# Patient Record
Sex: FEMALE | Race: WHITE | Employment: FULL TIME | URBAN - METROPOLITAN AREA
[De-identification: names, ages, dates, MRNs, and addresses within clinical notes are randomized per-mention and may not be internally consistent; named-entity substitution may affect disease eponyms.]

---

## 2017-11-12 ENCOUNTER — HOSPITAL ENCOUNTER (EMERGENCY)
Age: 42
Discharge: HOME | End: 2017-11-12
Attending: EMERGENCY MEDICINE
Payer: COMMERCIAL

## 2017-11-12 VITALS
WEIGHT: 223 LBS | HEART RATE: 64 BPM | TEMPERATURE: 98.3 F | HEIGHT: 68 IN | RESPIRATION RATE: 18 BRPM | SYSTOLIC BLOOD PRESSURE: 136 MMHG | OXYGEN SATURATION: 98 % | BODY MASS INDEX: 33.8 KG/M2 | DIASTOLIC BLOOD PRESSURE: 64 MMHG

## 2017-11-12 DIAGNOSIS — R42 DIZZINESS: Primary | ICD-10-CM

## 2017-11-12 DIAGNOSIS — R11.2 NON-INTRACTABLE VOMITING WITH NAUSEA, UNSPECIFIED VOMITING TYPE: ICD-10-CM

## 2017-11-12 LAB
ALBUMIN SERPL-MCNC: 3.7 G/DL (ref 3.5–5)
ALBUMIN/GLOB SERPL: 1 {RATIO} (ref 1.2–3.5)
ALP SERPL-CCNC: 44 U/L (ref 50–136)
ALT SERPL-CCNC: 23 U/L (ref 12–65)
ANION GAP SERPL CALC-SCNC: 15 MMOL/L (ref 7–16)
AST SERPL-CCNC: 19 U/L (ref 15–37)
ATRIAL RATE: 71 BPM
BACTERIA URNS QL MICRO: 0 /HPF
BASOPHILS # BLD: 0 K/UL (ref 0–0.2)
BASOPHILS NFR BLD: 1 % (ref 0–2)
BILIRUB SERPL-MCNC: 0.4 MG/DL (ref 0.2–1.1)
BUN SERPL-MCNC: 11 MG/DL (ref 6–23)
CALCIUM SERPL-MCNC: 9.2 MG/DL (ref 8.3–10.4)
CALCULATED P AXIS, ECG09: 38 DEGREES
CALCULATED R AXIS, ECG10: -5 DEGREES
CALCULATED T AXIS, ECG11: 54 DEGREES
CASTS URNS QL MICRO: NORMAL /LPF
CHLORIDE SERPL-SCNC: 103 MMOL/L (ref 98–107)
CO2 SERPL-SCNC: 21 MMOL/L (ref 21–32)
CREAT SERPL-MCNC: 1.12 MG/DL (ref 0.6–1)
DIAGNOSIS, 93000: NORMAL
DIFFERENTIAL METHOD BLD: NORMAL
EOSINOPHIL # BLD: 0.1 K/UL (ref 0–0.8)
EOSINOPHIL NFR BLD: 2 % (ref 0.5–7.8)
EPI CELLS #/AREA URNS HPF: NORMAL /HPF
ERYTHROCYTE [DISTWIDTH] IN BLOOD BY AUTOMATED COUNT: 12.7 % (ref 11.9–14.6)
GLOBULIN SER CALC-MCNC: 3.8 G/DL (ref 2.3–3.5)
GLUCOSE SERPL-MCNC: 96 MG/DL (ref 65–100)
HCT VFR BLD AUTO: 41.5 % (ref 35.8–46.3)
HGB BLD-MCNC: 13.6 G/DL (ref 11.7–15.4)
IMM GRANULOCYTES # BLD: 0 K/UL (ref 0–0.5)
IMM GRANULOCYTES NFR BLD AUTO: 0 % (ref 0–5)
LYMPHOCYTES # BLD: 2.4 K/UL (ref 0.5–4.6)
LYMPHOCYTES NFR BLD: 38 % (ref 13–44)
MAGNESIUM SERPL-MCNC: 2.4 MG/DL (ref 1.8–2.4)
MCH RBC QN AUTO: 29.6 PG (ref 26.1–32.9)
MCHC RBC AUTO-ENTMCNC: 32.8 G/DL (ref 31.4–35)
MCV RBC AUTO: 90.4 FL (ref 79.6–97.8)
MONOCYTES # BLD: 0.5 K/UL (ref 0.1–1.3)
MONOCYTES NFR BLD: 8 % (ref 4–12)
NEUTS SEG # BLD: 3.3 K/UL (ref 1.7–8.2)
NEUTS SEG NFR BLD: 51 % (ref 43–78)
P-R INTERVAL, ECG05: 148 MS
PLATELET # BLD AUTO: 211 K/UL (ref 150–450)
PMV BLD AUTO: 12.6 FL (ref 10.8–14.1)
POTASSIUM SERPL-SCNC: 4.1 MMOL/L (ref 3.5–5.1)
PROT SERPL-MCNC: 7.5 G/DL (ref 6.3–8.2)
Q-T INTERVAL, ECG07: 372 MS
QRS DURATION, ECG06: 92 MS
QTC CALCULATION (BEZET), ECG08: 404 MS
RBC # BLD AUTO: 4.59 M/UL (ref 4.05–5.25)
RBC #/AREA URNS HPF: NORMAL /HPF
SODIUM SERPL-SCNC: 139 MMOL/L (ref 136–145)
TROPONIN I SERPL-MCNC: <0.04 NG/ML (ref 0.02–0.05)
TSH SERPL DL<=0.005 MIU/L-ACNC: 2 UIU/ML (ref 0.36–3.74)
VENTRICULAR RATE, ECG03: 71 BPM
WBC # BLD AUTO: 6.5 K/UL (ref 4.3–11.1)
WBC URNS QL MICRO: NORMAL /HPF

## 2017-11-12 PROCEDURE — 96361 HYDRATE IV INFUSION ADD-ON: CPT | Performed by: EMERGENCY MEDICINE

## 2017-11-12 PROCEDURE — 99285 EMERGENCY DEPT VISIT HI MDM: CPT | Performed by: EMERGENCY MEDICINE

## 2017-11-12 PROCEDURE — 83735 ASSAY OF MAGNESIUM: CPT | Performed by: EMERGENCY MEDICINE

## 2017-11-12 PROCEDURE — 85025 COMPLETE CBC W/AUTO DIFF WBC: CPT | Performed by: EMERGENCY MEDICINE

## 2017-11-12 PROCEDURE — 81003 URINALYSIS AUTO W/O SCOPE: CPT | Performed by: EMERGENCY MEDICINE

## 2017-11-12 PROCEDURE — 80053 COMPREHEN METABOLIC PANEL: CPT | Performed by: EMERGENCY MEDICINE

## 2017-11-12 PROCEDURE — 74011250636 HC RX REV CODE- 250/636: Performed by: EMERGENCY MEDICINE

## 2017-11-12 PROCEDURE — 93005 ELECTROCARDIOGRAM TRACING: CPT | Performed by: EMERGENCY MEDICINE

## 2017-11-12 PROCEDURE — 84484 ASSAY OF TROPONIN QUANT: CPT | Performed by: EMERGENCY MEDICINE

## 2017-11-12 PROCEDURE — 81015 MICROSCOPIC EXAM OF URINE: CPT | Performed by: EMERGENCY MEDICINE

## 2017-11-12 PROCEDURE — 96374 THER/PROPH/DIAG INJ IV PUSH: CPT | Performed by: EMERGENCY MEDICINE

## 2017-11-12 PROCEDURE — 84443 ASSAY THYROID STIM HORMONE: CPT | Performed by: EMERGENCY MEDICINE

## 2017-11-12 RX ORDER — ONDANSETRON 8 MG/1
8 TABLET, ORALLY DISINTEGRATING ORAL
Qty: 12 TAB | Refills: 0 | Status: SHIPPED | OUTPATIENT
Start: 2017-11-12 | End: 2018-02-09

## 2017-11-12 RX ORDER — ONDANSETRON 2 MG/ML
4 INJECTION INTRAMUSCULAR; INTRAVENOUS
Status: COMPLETED | OUTPATIENT
Start: 2017-11-12 | End: 2017-11-12

## 2017-11-12 RX ADMIN — SODIUM CHLORIDE 1000 ML: 900 INJECTION, SOLUTION INTRAVENOUS at 09:40

## 2017-11-12 RX ADMIN — ONDANSETRON 4 MG: 2 INJECTION INTRAMUSCULAR; INTRAVENOUS at 09:40

## 2017-11-12 NOTE — ED PROVIDER NOTES
HPI Comments: Patient presents to the ER complaining of an episode of lightheadedness and dizziness this morning. Patient states symptoms started when she woke up this morning. Also felt that her heart was racing. Denies any fevers or vomiting. Currently being treated with antibiotics for left lower leg cellulitis. Patient is a 40 y.o. female presenting with dizziness. The history is provided by the patient. Dizziness   This is a new problem. The current episode started 1 to 2 hours ago. The problem has not changed since onset. Pertinent negatives include no speech difficulty. Associated symptoms include nausea. Pertinent negatives include no vomiting, no altered mental status, no confusion and no bowel incontinence. Past Medical History:   Diagnosis Date    Abnormal Pap smear of cervix     Carcinoma of cervix ([facility] [address] 2003    H/O seasonal allergies     Ovarian cyst        Past Surgical History:   Procedure Laterality Date    HX COLPOSCOPY  2003    HX FREE SKIN GRAFT Right     LEG    HX GYN      vaginal delivery x 2    HX LEEP PROCEDURE  2003    LA DEBRIDEMENT OPEN WOUND 20 SQ CM< Right     LEG         Family History:   Problem Relation Age of Onset    Stroke Father     Heart Disease Father     Hypertension Father     Elevated Lipids Father     COPD Father     Sleep Apnea Father     Arthritis-osteo Father     Heart Attack Father     Heart Failure Father     Kidney Disease Father     Hypertension Mother     Elevated Lipids Mother     Pacemaker Mother     Breast Cancer Maternal Aunt     Crohn's Disease Paternal Grandmother     Other Other      autoimmune    Colon Cancer Neg Hx     Ovarian Cancer Neg Hx     Uterine Cancer Neg Hx        Social History     Social History    Marital status:      Spouse name: N/A    Number of children: N/A    Years of education: N/A     Occupational History    Not on file.      Social History Main Topics    Smoking status: Never Smoker    Smokeless tobacco: Never Used    Alcohol use No    Drug use: No    Sexual activity: Yes     Partners: Male     Birth control/ protection: Condom     Other Topics Concern    Not on file     Social History Narrative         ALLERGIES: Gluten; Codeine; Egg; and Milk    Review of Systems   Constitutional: Positive for fatigue. Negative for fever and unexpected weight change. HENT: Negative for congestion and dental problem. Eyes: Negative for photophobia, redness and visual disturbance. Respiratory: Negative for cough and chest tightness. Cardiovascular: Negative for palpitations and leg swelling. Gastrointestinal: Positive for nausea. Negative for abdominal pain, bowel incontinence and vomiting. Endocrine: Negative for polydipsia and polyphagia. Genitourinary: Negative for flank pain, frequency and urgency. Musculoskeletal: Negative for gait problem. Skin: Negative for pallor and rash. Allergic/Immunologic: Negative for food allergies and immunocompromised state. Neurological: Positive for dizziness. Negative for syncope, speech difficulty, light-headedness and numbness. Hematological: Negative for adenopathy. Does not bruise/bleed easily. Psychiatric/Behavioral: Negative for confusion. Vitals:    11/12/17 0901   BP: 143/66   Pulse: 92   Resp: 20   Temp: 98.2 °F (36.8 °C)   SpO2: 98%   Weight: 101.2 kg (223 lb)   Height: 5' 8\" (1.727 m)            Physical Exam   Constitutional: She appears well-developed and well-nourished. HENT:   Head: Normocephalic and atraumatic. Mouth/Throat: Oropharynx is clear and moist.   Eyes: Conjunctivae and EOM are normal. Pupils are equal, round, and reactive to light. Neck: Normal range of motion. Neck supple. No tracheal deviation present. No thyromegaly present. Cardiovascular: Normal rate and regular rhythm. Exam reveals no friction rub. No murmur heard. Pulmonary/Chest: Effort normal and breath sounds normal. No respiratory distress. She has no rales. Abdominal: Soft. Bowel sounds are normal. She exhibits no distension. There is no tenderness. Musculoskeletal: Normal range of motion. She exhibits no edema or deformity. Neurological: She is alert. She has normal reflexes. No cranial nerve deficit. Skin: Skin is warm and dry. No rash noted. No erythema. Nursing note and vitals reviewed. MDM  Number of Diagnoses or Management Options  Diagnosis management comments: Differential diagnoses includes anxiety, dehydration, electrolyte abnormality  We'll obtain EKG, basic labs and urinalysis. Continue to monitor symptoms    10:16 AM  Normal EKG, normal labs as well as a normal urinalysis  Vital signs have remained stable. Symptomatically patient reports nausea and dizziness has resolved    Appears stable for discharge       Amount and/or Complexity of Data Reviewed  Clinical lab tests: reviewed and ordered    Risk of Complications, Morbidity, and/or Mortality  Presenting problems: low  Diagnostic procedures: low  Management options: low    Patient Progress  Patient progress: stable    ED Course       Procedures    Results Include:    Recent Results (from the past 24 hour(s))   CBC WITH AUTOMATED DIFF    Collection Time: 11/12/17  9:17 AM   Result Value Ref Range    WBC 6.5 4.3 - 11.1 K/uL    RBC 4.59 4.05 - 5.25 M/uL    HGB 13.6 11.7 - 15.4 g/dL    HCT 41.5 35.8 - 46.3 %    MCV 90.4 79.6 - 97.8 FL    MCH 29.6 26.1 - 32.9 PG    MCHC 32.8 31.4 - 35.0 g/dL    RDW 12.7 11.9 - 14.6 %    PLATELET 879 874 - 341 K/uL    MPV 12.6 10.8 - 14.1 FL    DF AUTOMATED      NEUTROPHILS 51 43 - 78 %    LYMPHOCYTES 38 13 - 44 %    MONOCYTES 8 4.0 - 12.0 %    EOSINOPHILS 2 0.5 - 7.8 %    BASOPHILS 1 0.0 - 2.0 %    IMMATURE GRANULOCYTES 0 0.0 - 5.0 %    ABS. NEUTROPHILS 3.3 1.7 - 8.2 K/UL    ABS. LYMPHOCYTES 2.4 0.5 - 4.6 K/UL    ABS. MONOCYTES 0.5 0.1 - 1.3 K/UL    ABS. EOSINOPHILS 0.1 0.0 - 0.8 K/UL    ABS. BASOPHILS 0.0 0.0 - 0.2 K/UL    ABS. IMM. GRANS. 0.0 0.0 - 0.5 K/UL   METABOLIC PANEL, COMPREHENSIVE    Collection Time: 11/12/17  9:17 AM   Result Value Ref Range    Sodium 139 136 - 145 mmol/L    Potassium 4.1 3.5 - 5.1 mmol/L    Chloride 103 98 - 107 mmol/L    CO2 21 21 - 32 mmol/L    Anion gap 15 7 - 16 mmol/L    Glucose 96 65 - 100 mg/dL    BUN 11 6 - 23 MG/DL    Creatinine 1.12 (H) 0.6 - 1.0 MG/DL    GFR est AA >60 >60 ml/min/1.73m2    GFR est non-AA 57 (L) >60 ml/min/1.73m2    Calcium 9.2 8.3 - 10.4 MG/DL    Bilirubin, total 0.4 0.2 - 1.1 MG/DL    ALT (SGPT) 23 12 - 65 U/L    AST (SGOT) 19 15 - 37 U/L    Alk. phosphatase 44 (L) 50 - 136 U/L    Protein, total 7.5 6.3 - 8.2 g/dL    Albumin 3.7 3.5 - 5.0 g/dL    Globulin 3.8 (H) 2.3 - 3.5 g/dL    A-G Ratio 1.0 (L) 1.2 - 3.5     MAGNESIUM    Collection Time: 11/12/17  9:17 AM   Result Value Ref Range    Magnesium 2.4 1.8 - 2.4 mg/dL   TSH 3RD GENERATION    Collection Time: 11/12/17  9:17 AM   Result Value Ref Range    TSH 2.004 0.358 - 3.740 uIU/mL   TROPONIN I    Collection Time: 11/12/17  9:17 AM   Result Value Ref Range    Troponin-I, Qt. <0.04 0.02 - 0.05 NG/ML   EKG, 12 LEAD, INITIAL    Collection Time: 11/12/17  9:25 AM   Result Value Ref Range    Ventricular Rate 71 BPM    Atrial Rate 71 BPM    P-R Interval 148 ms    QRS Duration 92 ms    Q-T Interval 372 ms    QTC Calculation (Bezet) 404 ms    Calculated P Axis 38 degrees    Calculated R Axis -5 degrees    Calculated T Axis 54 degrees    Diagnosis       !! AGE AND GENDER SPECIFIC ECG ANALYSIS !!   Normal sinus rhythm  Normal ECG  No previous ECGs available     URINE MICROSCOPIC    Collection Time: 11/12/17  9:38 AM   Result Value Ref Range    WBC 0-3 0 /hpf    RBC 5-10 0 /hpf    Epithelial cells 3-5 0 /hpf    Bacteria 0 0 /hpf    Casts 0-3 0 /lpf

## 2017-11-12 NOTE — ED TRIAGE NOTES
Pt states she has been taking antibiotics for a cellulitis in left leg for about four weeks but states this morning she woke up with nausea and dizziness. States she feels really tired today also.

## 2017-11-12 NOTE — LETTER
[facility] EMERGENCY DEPT 
[address] [place] [id number] 
[phone] Work/School Note Date: 11/12/2017 To Whom It May concern: 
 
Jayme Fleming was seen and treated today in the emergency room by the following provider(s): 
Attending Provider: Mekhi Gerard MD. Jayme Fleming may return to work on 11/14/17. Sincerely, Amparo Costa RN

## 2017-11-12 NOTE — DISCHARGE INSTRUCTIONS
Nausea and Vomiting: Care Instructions  Your Care Instructions    When you are nauseated, you may feel weak and sweaty and notice a lot of saliva in your mouth. Nausea often leads to vomiting. Most of the time you do not need to worry about nausea and vomiting, but they can be signs of other illnesses. Two common causes of nausea and vomiting are stomach flu and food poisoning. Nausea and vomiting from viral stomach flu will usually start to improve within 24 hours. Nausea and vomiting from food poisoning may last from 12 to 48 hours. The doctor has checked you carefully, but problems can develop later. If you notice any problems or new symptoms, get medical treatment right away. Follow-up care is a key part of your treatment and safety. Be sure to make and go to all appointments, and call your doctor if you are having problems. It's also a good idea to know your test results and keep a list of the medicines you take. How can you care for yourself at home? · To prevent dehydration, drink plenty of fluids, enough so that your urine is light yellow or clear like water. Choose water and other caffeine-free clear liquids until you feel better. If you have kidney, heart, or liver disease and have to limit fluids, talk with your doctor before you increase the amount of fluids you drink. · Rest in bed until you feel better. · When you are able to eat, try clear soups, mild foods, and liquids until all symptoms are gone for 12 to 48 hours. Other good choices include dry toast, crackers, cooked cereal, and gelatin dessert, such as Jell-O. When should you call for help? Call 911 anytime you think you may need emergency care. For example, call if:  ? · You passed out (lost consciousness). ?Call your doctor now or seek immediate medical care if:  ? · You have symptoms of dehydration, such as:  ¨ Dry eyes and a dry mouth. ¨ Passing only a little dark urine.   ¨ Feeling thirstier than usual.   ? · You have new or worsening belly pain. ? · You have a new or higher fever. ? · You vomit blood or what looks like coffee grounds. ? Watch closely for changes in your health, and be sure to contact your doctor if:  ? · You have ongoing nausea and vomiting. ? · Your vomiting is getting worse. ? · Your vomiting lasts longer than 2 days. ? · You are not getting better as expected. Where can you learn more? Go to http://brittaney-rayshawn.info/. Enter 25 [id number] in the search box to learn more about \"Nausea and Vomiting: Care Instructions. \"  Current as of: March 20, 2017  Content Version: 11.4  © 3775-9277 [employer]. Care instructions adapted under license by [facility] (which disclaims liability or warranty for this information). If you have questions about a medical condition or this instruction, always ask your healthcare professional. [address] any warranty or liability for your use of this information.

## 2017-11-12 NOTE — ED NOTES
I have reviewed discharge instructions with the patient, spouse and parent. The patient, spouse and parent verbalized understanding. Patient left ED via Discharge Method: ambulatory to Home with family. Opportunity for questions and clarification provided. Patient given 1 scripts.

## 2018-02-09 ENCOUNTER — HOSPITAL ENCOUNTER (EMERGENCY)
Age: 43
Discharge: HOME | End: 2018-02-09
Attending: EMERGENCY MEDICINE
Payer: COMMERCIAL

## 2018-02-09 ENCOUNTER — APPOINTMENT (OUTPATIENT)
Dept: GENERAL RADIOLOGY | Age: 43
End: 2018-02-09
Attending: EMERGENCY MEDICINE
Payer: COMMERCIAL

## 2018-02-09 VITALS
DIASTOLIC BLOOD PRESSURE: 67 MMHG | RESPIRATION RATE: 16 BRPM | BODY MASS INDEX: 33.8 KG/M2 | OXYGEN SATURATION: 98 % | TEMPERATURE: 98.9 F | HEART RATE: 98 BPM | WEIGHT: 223 LBS | SYSTOLIC BLOOD PRESSURE: 130 MMHG | HEIGHT: 68 IN

## 2018-02-09 DIAGNOSIS — B34.9 VIRAL SYNDROME: ICD-10-CM

## 2018-02-09 DIAGNOSIS — R19.7 DIARRHEA, UNSPECIFIED TYPE: ICD-10-CM

## 2018-02-09 DIAGNOSIS — R11.2 NON-INTRACTABLE VOMITING WITH NAUSEA, UNSPECIFIED VOMITING TYPE: Primary | ICD-10-CM

## 2018-02-09 LAB
ALBUMIN SERPL-MCNC: 3.2 G/DL (ref 3.5–5)
ALBUMIN/GLOB SERPL: 0.8 {RATIO} (ref 1.2–3.5)
ALP SERPL-CCNC: 46 U/L (ref 50–136)
ALT SERPL-CCNC: 110 U/L (ref 12–65)
ANION GAP SERPL CALC-SCNC: 12 MMOL/L (ref 7–16)
AST SERPL-CCNC: 77 U/L (ref 15–37)
BASOPHILS # BLD: 0 K/UL (ref 0–0.2)
BASOPHILS NFR BLD: 0 % (ref 0–2)
BILIRUB SERPL-MCNC: 0.3 MG/DL (ref 0.2–1.1)
BUN SERPL-MCNC: 11 MG/DL (ref 6–23)
CALCIUM SERPL-MCNC: 8.5 MG/DL (ref 8.3–10.4)
CHLORIDE SERPL-SCNC: 106 MMOL/L (ref 98–107)
CO2 SERPL-SCNC: 23 MMOL/L (ref 21–32)
CREAT SERPL-MCNC: 0.92 MG/DL (ref 0.6–1)
DIFFERENTIAL METHOD BLD: ABNORMAL
EOSINOPHIL # BLD: 0 K/UL (ref 0–0.8)
EOSINOPHIL NFR BLD: 1 % (ref 0.5–7.8)
ERYTHROCYTE [DISTWIDTH] IN BLOOD BY AUTOMATED COUNT: 13 % (ref 11.9–14.6)
GLOBULIN SER CALC-MCNC: 3.9 G/DL (ref 2.3–3.5)
GLUCOSE SERPL-MCNC: 104 MG/DL (ref 65–100)
HCT VFR BLD AUTO: 42.6 % (ref 35.8–46.3)
HETEROPH AB SER QL: NEGATIVE
HGB BLD-MCNC: 13.5 G/DL (ref 11.7–15.4)
IMM GRANULOCYTES # BLD: 0 K/UL (ref 0–0.5)
IMM GRANULOCYTES NFR BLD AUTO: 0 % (ref 0–5)
LYMPHOCYTES # BLD: 1.9 K/UL (ref 0.5–4.6)
LYMPHOCYTES NFR BLD: 53 % (ref 13–44)
MCH RBC QN AUTO: 28.2 PG (ref 26.1–32.9)
MCHC RBC AUTO-ENTMCNC: 31.7 G/DL (ref 31.4–35)
MCV RBC AUTO: 88.9 FL (ref 79.6–97.8)
MONOCYTES # BLD: 0.4 K/UL (ref 0.1–1.3)
MONOCYTES NFR BLD: 11 % (ref 4–12)
NEUTS SEG # BLD: 1.2 K/UL (ref 1.7–8.2)
NEUTS SEG NFR BLD: 35 % (ref 43–78)
PLATELET # BLD AUTO: 193 K/UL (ref 150–450)
PMV BLD AUTO: 12.4 FL (ref 10.8–14.1)
POTASSIUM SERPL-SCNC: 3.6 MMOL/L (ref 3.5–5.1)
PROT SERPL-MCNC: 7.1 G/DL (ref 6.3–8.2)
RBC # BLD AUTO: 4.79 M/UL (ref 4.05–5.25)
SODIUM SERPL-SCNC: 141 MMOL/L (ref 136–145)
WBC # BLD AUTO: 3.5 K/UL (ref 4.3–11.1)

## 2018-02-09 PROCEDURE — 81003 URINALYSIS AUTO W/O SCOPE: CPT | Performed by: EMERGENCY MEDICINE

## 2018-02-09 PROCEDURE — 96374 THER/PROPH/DIAG INJ IV PUSH: CPT | Performed by: EMERGENCY MEDICINE

## 2018-02-09 PROCEDURE — 86308 HETEROPHILE ANTIBODY SCREEN: CPT | Performed by: EMERGENCY MEDICINE

## 2018-02-09 PROCEDURE — 94640 AIRWAY INHALATION TREATMENT: CPT

## 2018-02-09 PROCEDURE — 85025 COMPLETE CBC W/AUTO DIFF WBC: CPT | Performed by: EMERGENCY MEDICINE

## 2018-02-09 PROCEDURE — 99284 EMERGENCY DEPT VISIT MOD MDM: CPT | Performed by: EMERGENCY MEDICINE

## 2018-02-09 PROCEDURE — 74011250636 HC RX REV CODE- 250/636: Performed by: EMERGENCY MEDICINE

## 2018-02-09 PROCEDURE — 80053 COMPREHEN METABOLIC PANEL: CPT | Performed by: EMERGENCY MEDICINE

## 2018-02-09 PROCEDURE — 71046 X-RAY EXAM CHEST 2 VIEWS: CPT

## 2018-02-09 PROCEDURE — 74011000250 HC RX REV CODE- 250: Performed by: EMERGENCY MEDICINE

## 2018-02-09 PROCEDURE — 96361 HYDRATE IV INFUSION ADD-ON: CPT | Performed by: EMERGENCY MEDICINE

## 2018-02-09 PROCEDURE — 94664 DEMO&/EVAL PT USE INHALER: CPT

## 2018-02-09 RX ORDER — ONDANSETRON 4 MG/1
4 TABLET, ORALLY DISINTEGRATING ORAL
Qty: 12 TAB | Refills: 0 | Status: SHIPPED | OUTPATIENT
Start: 2018-02-09 | End: 2018-04-16

## 2018-02-09 RX ORDER — ALBUTEROL SULFATE 90 UG/1
2 AEROSOL, METERED RESPIRATORY (INHALATION)
Qty: 1 INHALER | Refills: 2 | Status: SHIPPED | OUTPATIENT
Start: 2018-02-09 | End: 2018-12-19

## 2018-02-09 RX ORDER — ONDANSETRON 2 MG/ML
4 INJECTION INTRAMUSCULAR; INTRAVENOUS
Status: COMPLETED | OUTPATIENT
Start: 2018-02-09 | End: 2018-02-09

## 2018-02-09 RX ORDER — IPRATROPIUM BROMIDE AND ALBUTEROL SULFATE 2.5; .5 MG/3ML; MG/3ML
3 SOLUTION RESPIRATORY (INHALATION)
Status: COMPLETED | OUTPATIENT
Start: 2018-02-09 | End: 2018-02-09

## 2018-02-09 RX ADMIN — IPRATROPIUM BROMIDE AND ALBUTEROL SULFATE 3 ML: 2.5; .5 SOLUTION RESPIRATORY (INHALATION) at 13:14

## 2018-02-09 RX ADMIN — ONDANSETRON 4 MG: 2 INJECTION INTRAMUSCULAR; INTRAVENOUS at 13:57

## 2018-02-09 RX ADMIN — SODIUM CHLORIDE 1000 ML: 900 INJECTION, SOLUTION INTRAVENOUS at 14:00

## 2018-02-09 NOTE — ED PROVIDER NOTES
HPI Comments: Patient is a 28-year-old female who is coming in after being sick since Sunday. She states she went to an urgent care had a flu swab done which was negative. She was diagnosed with sinusitis and bronchitis prescribed antibiotic and a cough syrup which she believes had a pain medicine in that she's felt very sleepy while taking this. She is now developed some vomiting and diarrhea. She denies any abdominal pain. She has no pain anywhere now just feels generalized weakness and fatigue. Patient is a 40 y.o. female presenting with lethargy. The history is provided by the patient. Lethargy   Pertinent negatives include no chest pain, no abdominal pain and no shortness of breath. Past Medical History:   Diagnosis Date    Abnormal Pap smear of cervix     Carcinoma of cervix ([facility] [address] 2003    H/O seasonal allergies     Ovarian cyst        Past Surgical History:   Procedure Laterality Date    HX COLPOSCOPY  2003    HX FREE SKIN GRAFT Right     LEG    HX GYN      vaginal delivery x 2    HX LEEP PROCEDURE  2003    LA DEBRIDEMENT OPEN WOUND 20 SQ CM< Right     LEG         Family History:   Problem Relation Age of Onset    Stroke Father     Heart Disease Father     Hypertension Father     Elevated Lipids Father     COPD Father     Sleep Apnea Father     Arthritis-osteo Father     Heart Attack Father     Heart Failure Father     Kidney Disease Father     Hypertension Mother     Elevated Lipids Mother     Pacemaker Mother     Breast Cancer Maternal Aunt     Crohn's Disease Paternal Grandmother     Other Other      autoimmune    Colon Cancer Neg Hx     Ovarian Cancer Neg Hx     Uterine Cancer Neg Hx        Social History     Social History    Marital status:      Spouse name: N/A    Number of children: N/A    Years of education: N/A     Occupational History    Not on file.      Social History Main Topics    Smoking status: Never Smoker    Smokeless tobacco: Never Used    Alcohol use No    Drug use: No    Sexual activity: Yes     Partners: Male     Birth control/ protection: Condom     Other Topics Concern    Not on file     Social History Narrative         ALLERGIES: Gluten; Codeine; Egg; and Milk    Review of Systems   Constitutional: Positive for chills, fatigue and fever. Respiratory: Negative for chest tightness, shortness of breath, wheezing and stridor. Cardiovascular: Negative for chest pain and palpitations. Gastrointestinal: Negative for abdominal pain, diarrhea, nausea and vomiting. Musculoskeletal: Negative for neck pain and neck stiffness. Skin: Negative. All other systems reviewed and are negative. Vitals:    02/09/18 1206   BP: 117/83   Pulse: 72   Resp: 19   Temp: 97.9 °F (36.6 °C)   SpO2: 100%   Weight: 101.2 kg (223 lb)   Height: 5' 8\" (1.727 m)            Physical Exam   Constitutional: She is oriented to person, place, and time. She appears well-developed and well-nourished. No distress. HENT:   Head: Normocephalic and atraumatic. Eyes: Conjunctivae are normal. No scleral icterus. Neck: Normal range of motion. Neck supple. Cardiovascular: Normal rate, regular rhythm and normal heart sounds. Pulmonary/Chest: Effort normal. No stridor. No respiratory distress. She has wheezes (mainly when she is coughing.  good airmovement). She has no rales. She exhibits no tenderness. Abdominal: Soft. She exhibits no distension. There is no tenderness. There is no rebound and no guarding. Neurological: She is alert and oriented to person, place, and time. No cranial nerve deficit. Coordination normal.   No focal weakness   Skin: Skin is warm and dry. No rash noted. She is not diaphoretic. No erythema. Psychiatric: She has a normal mood and affect. Her behavior is normal.   Nursing note and vitals reviewed.        MDM  Number of Diagnoses or Management Options  Diarrhea, unspecified type:   Non-intractable vomiting with nausea, unspecified vomiting type:   Viral syndrome:   Diagnosis management comments: X-ray normal patient has been up walking around filling pattern getting IV fluids lab work consistent with viral syndrome she has no abdominal pain. I will have her stop cough medicine and will start Zofran and albuterol. Destinee Atkinson MD; 2/9/2018 @2:51 PM Voice dictation software was used during the making of this note. This software is not perfect and grammatical and other typographical errors may be present.   This note has not been proofread for errors.  ===================================================================        Amount and/or Complexity of Data Reviewed  Clinical lab tests: ordered and reviewed (Results for orders placed or performed during the hospital encounter of 02/09/18  -CBC WITH AUTOMATED DIFF       Result                                            Value                         Ref Range                       WBC                                               3.5 (L)                       4.3 - 11.1 K/uL                 RBC                                               4.79                          4.05 - 5.25 M/uL                HGB                                               13.5                          11.7 - 15.4 g/dL                HCT                                               42.6                          35.8 - 46.3 %                   MCV                                               88.9                          79.6 - 97.8 FL                  MCH                                               28.2                          26.1 - 32.9 PG                  MCHC                                              31.7                          31.4 - 35.0 g/dL                RDW                                               13.0                          11.9 - 14.6 %                   PLATELET                                          193                           150 - 450 K/uL                  MPV 12.4                          10.8 - 14.1 FL                  DF                                                AUTOMATED                                                     NEUTROPHILS                                       35 (L)                        43 - 78 %                       LYMPHOCYTES                                       53 (H)                        13 - 44 %                       MONOCYTES                                         11                            4.0 - 12.0 %                    EOSINOPHILS                                       1                             0.5 - 7.8 %                     BASOPHILS                                         0                             0.0 - 2.0 %                     IMMATURE GRANULOCYTES                             0                             0.0 - 5.0 %                     ABS. NEUTROPHILS                                  1.2 (L)                       1.7 - 8.2 K/UL                  ABS. LYMPHOCYTES                                  1.9                           0.5 - 4.6 K/UL                  ABS. MONOCYTES                                    0.4                           0.1 - 1.3 K/UL                  ABS. EOSINOPHILS                                  0.0                           0.0 - 0.8 K/UL                  ABS. BASOPHILS                                    0.0                           0.0 - 0.2 K/UL                  ABS. IMM.  GRANS.                                  0.0                           0.0 - 0.5 K/UL             -METABOLIC PANEL, COMPREHENSIVE       Result                                            Value                         Ref Range                       Sodium                                            141                           136 - 145 mmol/L                Potassium                                         3.6                           3.5 - 5.1 mmol/L                Chloride 106                           98 - 107 mmol/L                 CO2                                               23                            21 - 32 mmol/L                  Anion gap                                         12                            7 - 16 mmol/L                   Glucose                                           104 (H)                       65 - 100 mg/dL                  BUN                                               11                            6 - 23 MG/DL                    Creatinine                                        0.92                          0.6 - 1.0 MG/DL                 GFR est AA                                        >60                           >60 ml/min/1.73m2               GFR est non-AA                                    >60                           >60 ml/min/1.73m2               Calcium                                           8.5                           8.3 - 10.4 MG/DL                Bilirubin, total                                  0.3                           0.2 - 1.1 MG/DL                 ALT (SGPT)                                        110 (H)                       12 - 65 U/L                     AST (SGOT)                                        77 (H)                        15 - 37 U/L                     Alk. phosphatase                                  46 (L)                        50 - 136 U/L                    Protein, total                                    7.1                           6.3 - 8.2 g/dL                  Albumin                                           3.2 (L)                       3.5 - 5.0 g/dL                  Globulin                                          3.9 (H)                       2.3 - 3.5 g/dL                  A-G Ratio                                         0.8 (L)                       1.2 - 3.5                 )  Tests in the radiology section of CPT®: ordered and reviewed (Xr Chest Pa Lat    Result Date: 2/9/2018  2 View Chest X-Ray 2/9/2018 12:21 PM INDICATION: Bronchitis, fever and cough COMPARISON: None available at this hospital PACS FINDINGS: Upright PA and Lateral views are submitted. Incidental-mild prominence of breast shadows. The cardiac and mediastinal contours are normal. The lungs are normally inflated and clear, with normal pulmonary vascularity. There is no focal consolidation, nodule, or pleural effusion. Grossly, the chest wall structures are intact.      IMPRESSION: No acute cardiopulmonary finding.    )          ED Course       Procedures

## 2018-02-09 NOTE — DISCHARGE INSTRUCTIONS
Diarrhea: Care Instructions  Your Care Instructions    Diarrhea is loose, watery stools (bowel movements). The exact cause is often hard to find. Sometimes diarrhea is your body's way of getting rid of what caused an upset stomach. Viruses, food poisoning, and many medicines can cause diarrhea. Some people get diarrhea in response to emotional stress, anxiety, or certain foods. Almost everyone has diarrhea now and then. It usually isn't serious, and your stools will return to normal soon. The important thing to do is replace the fluids you have lost, so you can prevent dehydration. The doctor has checked you carefully, but problems can develop later. If you notice any problems or new symptoms, get medical treatment right away. Follow-up care is a key part of your treatment and safety. Be sure to make and go to all appointments, and call your doctor if you are having problems. It's also a good idea to know your test results and keep a list of the medicines you take. How can you care for yourself at home? · Watch for signs of dehydration, which means your body has lost too much water. Dehydration is a serious condition and should be treated right away. Signs of dehydration are:  ¨ Increasing thirst and dry eyes and mouth. ¨ Feeling faint or lightheaded. ¨ Darker urine, and a smaller amount of urine than normal.  · To prevent dehydration, drink plenty of fluids, enough so that your urine is light yellow or clear like water. Choose water and other caffeine-free clear liquids until you feel better. If you have kidney, heart, or liver disease and have to limit fluids, talk with your doctor before you increase the amount of fluids you drink. · Begin eating small amounts of mild foods the next day, if you feel like it. ¨ Try yogurt that has live cultures of Lactobacillus. (Check the label.)  ¨ Avoid spicy foods, fruits, alcohol, and caffeine until 48 hours after all symptoms are gone.   ¨ Avoid chewing gum that contains sorbitol. ¨ Avoid dairy products (except for yogurt with Lactobacillus) while you have diarrhea and for 3 days after symptoms are gone. · The doctor may recommend that you take over-the-counter medicine, such as loperamide (Imodium), if you still have diarrhea after 6 hours. Read and follow all instructions on the label. Do not use this medicine if you have bloody diarrhea, a high fever, or other signs of serious illness. Call your doctor if you think you are having a problem with your medicine. When should you call for help? Call 911 anytime you think you may need emergency care. For example, call if:  ? · You passed out (lost consciousness). ? · Your stools are maroon or very bloody. ?Call your doctor now or seek immediate medical care if:  ? · You are dizzy or lightheaded, or you feel like you may faint. ? · Your stools are black and look like tar, or they have streaks of blood. ? · You have new or worse belly pain. ? · You have symptoms of dehydration, such as:  ¨ Dry eyes and a dry mouth. ¨ Passing only a little dark urine. ¨ Feeling thirstier than usual.   ? · You have a new or higher fever. ? Watch closely for changes in your health, and be sure to contact your doctor if:  ? · Your diarrhea is getting worse. ? · You see pus in the diarrhea. ? · You are not getting better after 2 days (48 hours). Where can you learn more? Go to http://brittaney-rayshawn.info/. Enter J140 in the search box to learn more about \"Diarrhea: Care Instructions. \"  Current as of: March 20, 2017  Content Version: 11.4  © 8329-8747 [employer]. Care instructions adapted under license by [employer] (which disclaims liability or warranty for this information).  If you have questions about a medical condition or this instruction, always ask your healthcare professional. [address] any warranty or liability for your use of this information Nausea and Vomiting: Care Instructions  Your Care Instructions    When you are nauseated, you may feel weak and sweaty and notice a lot of saliva in your mouth. Nausea often leads to vomiting. Most of the time you do not need to worry about nausea and vomiting, but they can be signs of other illnesses. Two common causes of nausea and vomiting are stomach flu and food poisoning. Nausea and vomiting from viral stomach flu will usually start to improve within 24 hours. Nausea and vomiting from food poisoning may last from 12 to 48 hours. The doctor has checked you carefully, but problems can develop later. If you notice any problems or new symptoms, get medical treatment right away. Follow-up care is a key part of your treatment and safety. Be sure to make and go to all appointments, and call your doctor if you are having problems. It's also a good idea to know your test results and keep a list of the medicines you take. How can you care for yourself at home? · To prevent dehydration, drink plenty of fluids, enough so that your urine is light yellow or clear like water. Choose water and other caffeine-free clear liquids until you feel better. If you have kidney, heart, or liver disease and have to limit fluids, talk with your doctor before you increase the amount of fluids you drink. · Rest in bed until you feel better. · When you are able to eat, try clear soups, mild foods, and liquids until all symptoms are gone for 12 to 48 hours. Other good choices include dry toast, crackers, cooked cereal, and gelatin dessert, such as Jell-O. When should you call for help? Call 911 anytime you think you may need emergency care. For example, call if:  ? · You passed out (lost consciousness). ?Call your doctor now or seek immediate medical care if:  ? · You have symptoms of dehydration, such as:  ¨ Dry eyes and a dry mouth. ¨ Passing only a little dark urine.   ¨ Feeling thirstier than usual.   ? · You have new or worsening belly pain. ? · You have a new or higher fever. ? · You vomit blood or what looks like coffee grounds. ? Watch closely for changes in your health, and be sure to contact your doctor if:  ? · You have ongoing nausea and vomiting. ? · Your vomiting is getting worse. ? · Your vomiting lasts longer than 2 days. ? · You are not getting better as expected. Where can you learn more? Go to http://brittaney-rayshawn.info/. Enter 25 [id number] in the search box to learn more about \"Nausea and Vomiting: Care Instructions. \"  Current as of: March 20, 2017  Content Version: 11.4  © 3333-3568 [employer]. Care instructions adapted under license by [employer] (which disclaims liability or warranty for this information). If you have questions about a medical condition or this instruction, always ask your healthcare professional. [place] [address] any warranty or liability for your use of this information. Viral Respiratory Infection: Care Instructions  Your Care Instructions    Viruses are very small organisms. They grow in number after they enter your body. There are many types that cause different illnesses, such as colds and the mumps. The symptoms of a viral respiratory infection often start quickly. They include a fever, sore throat, and runny nose. You may also just not feel well. Or you may not want to eat much. Most viral respiratory infections are not serious. They usually get better with time and self-care. Antibiotics are not used to treat a viral infection. That's because antibiotics will not help cure a viral illness. In some cases, antiviral medicine can help your body fight a serious viral infection. Follow-up care is a key part of your treatment and safety. Be sure to make and go to all appointments, and call your doctor if you are having problems.  It's also a good idea to know your test results and keep a list of the medicines you take.  How can you care for yourself at home? · Rest as much as possible until you feel better. · Be safe with medicines. Take your medicine exactly as prescribed. Call your doctor if you think you are having a problem with your medicine. You will get more details on the specific medicine your doctor prescribes. · Take an over-the-counter pain medicine, such as acetaminophen (Tylenol), ibuprofen (Advil, Motrin), or naproxen (Aleve), as needed for pain and fever. Read and follow all instructions on the label. Do not give aspirin to anyone younger than 20. It has been linked to Reye syndrome, a serious illness. · Drink plenty of fluids, enough so that your urine is light yellow or clear like water. Hot fluids, such as tea or soup, may help relieve congestion in your nose and throat. If you have kidney, heart, or liver disease and have to limit fluids, talk with your doctor before you increase the amount of fluids you drink. · Try to clear mucus from your lungs by breathing deeply and coughing. · Gargle with warm salt water once an hour. This can help reduce swelling and throat pain. Use 1 teaspoon of salt mixed in 1 cup of warm water. · Do not smoke or allow others to smoke around you. If you need help quitting, talk to your doctor about stop-smoking programs and medicines. These can increase your chances of quitting for good. To avoid spreading the virus  · Cough or sneeze into a tissue. Then throw the tissue away. · If you don't have a tissue, use your hand to cover your cough or sneeze. Then clean your hand. You can also cough into your sleeve. · Wash your hands often. Use soap and warm water. Wash for 15 to 20 seconds each time. · If you don't have soap and water near you, you can clean your hands with alcohol wipes or gel. When should you call for help? Call your doctor now or seek immediate medical care if:  ? · You have a new or higher fever. ? · Your fever lasts more than 48 hours.    ? · You have trouble breathing. ? · You have a fever with a stiff neck or a severe headache. ? · You are sensitive to light. ? · You feel very sleepy or confused. ? Watch closely for changes in your health, and be sure to contact your doctor if:  ? · You do not get better as expected. Where can you learn more? Go to http://brittaney-rayshawn.info/. Enter K563 in the search box to learn more about \"Viral Respiratory Infection: Care Instructions. \"  Current as of: May 12, 2017  Content Version: 11.4  © 4774-0602 [employer]. Care instructions adapted under license by [employer] (which disclaims liability or warranty for this information). If you have questions about a medical condition or this instruction, always ask your healthcare professional. [address] any warranty or liability for your use of this information.

## 2018-02-09 NOTE — ED TRIAGE NOTES
Pt in states she has been sick since Saturday. States seen at urgent care on Sunday and diagnosed with bronchitis sinus infection and fluid on ears. States negative flu swab. States since she has continued to feel bad. States began vomiting today. States diarrhea and fever. Attempted to drink gatorade but began vomiting. States cough.

## 2018-02-09 NOTE — ED NOTES
I have reviewed discharge instructions with the patient. The patient verbalized understanding. Patient left ED via Discharge Method: ambulatory to Home with (insert name of family/friend, self, transport vai family). Opportunity for questions and clarification provided. Patient given 2 scripts. To continue your aftercare when you leave the hospital, you may receive an automated call from our care team to check in on how you are doing. This is a free service and part of our promise to provide the best care and service to meet your aftercare needs.  If you have questions, or wish to unsubscribe from this service please call [phone]. Thank you for Choosing our [facility] Emergency Department.

## 2018-04-16 PROBLEM — K81.2 ACUTE CHOLECYSTITIS WITH CHRONIC CHOLECYSTITIS: Status: ACTIVE | Noted: 2018-03-08

## 2018-12-19 ENCOUNTER — HOSPITAL ENCOUNTER (EMERGENCY)
Age: 43
Discharge: HOME | End: 2018-12-19
Attending: EMERGENCY MEDICINE
Payer: COMMERCIAL

## 2018-12-19 ENCOUNTER — APPOINTMENT (OUTPATIENT)
Dept: CT IMAGING | Age: 43
End: 2018-12-19
Attending: EMERGENCY MEDICINE
Payer: COMMERCIAL

## 2018-12-19 VITALS
HEART RATE: 81 BPM | WEIGHT: 181 LBS | HEIGHT: 69 IN | RESPIRATION RATE: 18 BRPM | DIASTOLIC BLOOD PRESSURE: 63 MMHG | TEMPERATURE: 98.2 F | SYSTOLIC BLOOD PRESSURE: 118 MMHG | OXYGEN SATURATION: 98 % | BODY MASS INDEX: 26.81 KG/M2

## 2018-12-19 DIAGNOSIS — J01.00 ACUTE NON-RECURRENT MAXILLARY SINUSITIS: Primary | ICD-10-CM

## 2018-12-19 LAB
ANION GAP SERPL CALC-SCNC: 9 MMOL/L
BASOPHILS # BLD: 0 K/UL (ref 0–0.2)
BASOPHILS NFR BLD: 0 % (ref 0–2)
BUN SERPL-MCNC: 12 MG/DL (ref 6–23)
CALCIUM SERPL-MCNC: 8.9 MG/DL (ref 8.3–10.4)
CHLORIDE SERPL-SCNC: 105 MMOL/L (ref 98–107)
CO2 SERPL-SCNC: 25 MMOL/L (ref 21–32)
CREAT SERPL-MCNC: 0.91 MG/DL (ref 0.6–1)
DIFFERENTIAL METHOD BLD: NORMAL
EOSINOPHIL # BLD: 0.1 K/UL (ref 0–0.8)
EOSINOPHIL NFR BLD: 2 % (ref 0.5–7.8)
ERYTHROCYTE [DISTWIDTH] IN BLOOD BY AUTOMATED COUNT: 12.3 % (ref 11.9–14.6)
GLUCOSE SERPL-MCNC: 90 MG/DL (ref 65–100)
HCG UR QL: NEGATIVE
HCT VFR BLD AUTO: 38.3 % (ref 35.8–46.3)
HGB BLD-MCNC: 12.5 G/DL (ref 11.7–15.4)
IMM GRANULOCYTES # BLD: 0 K/UL (ref 0–0.5)
IMM GRANULOCYTES NFR BLD AUTO: 0 % (ref 0–5)
LYMPHOCYTES # BLD: 3.6 K/UL (ref 0.5–4.6)
LYMPHOCYTES NFR BLD: 38 % (ref 13–44)
MCH RBC QN AUTO: 30.6 PG (ref 26.1–32.9)
MCHC RBC AUTO-ENTMCNC: 32.6 G/DL (ref 31.4–35)
MCV RBC AUTO: 93.9 FL (ref 79.6–97.8)
MONOCYTES # BLD: 0.8 K/UL (ref 0.1–1.3)
MONOCYTES NFR BLD: 9 % (ref 4–12)
NEUTS SEG # BLD: 4.9 K/UL (ref 1.7–8.2)
NEUTS SEG NFR BLD: 51 % (ref 43–78)
NRBC # BLD: 0 K/UL (ref 0–0.2)
PLATELET # BLD AUTO: 206 K/UL (ref 150–450)
PMV BLD AUTO: 12 FL (ref 9.4–12.3)
POTASSIUM SERPL-SCNC: 3.9 MMOL/L (ref 3.5–5.1)
RBC # BLD AUTO: 4.08 M/UL (ref 4.05–5.2)
SODIUM SERPL-SCNC: 139 MMOL/L (ref 136–145)
WBC # BLD AUTO: 9.6 K/UL (ref 4.3–11.1)

## 2018-12-19 PROCEDURE — 80048 BASIC METABOLIC PNL TOTAL CA: CPT

## 2018-12-19 PROCEDURE — 81025 URINE PREGNANCY TEST: CPT

## 2018-12-19 PROCEDURE — 74011250636 HC RX REV CODE- 250/636: Performed by: EMERGENCY MEDICINE

## 2018-12-19 PROCEDURE — 70450 CT HEAD/BRAIN W/O DYE: CPT

## 2018-12-19 PROCEDURE — 85025 COMPLETE CBC W/AUTO DIFF WBC: CPT

## 2018-12-19 PROCEDURE — 96374 THER/PROPH/DIAG INJ IV PUSH: CPT | Performed by: EMERGENCY MEDICINE

## 2018-12-19 PROCEDURE — 74011250637 HC RX REV CODE- 250/637: Performed by: EMERGENCY MEDICINE

## 2018-12-19 PROCEDURE — 96375 TX/PRO/DX INJ NEW DRUG ADDON: CPT | Performed by: EMERGENCY MEDICINE

## 2018-12-19 PROCEDURE — 99284 EMERGENCY DEPT VISIT MOD MDM: CPT | Performed by: EMERGENCY MEDICINE

## 2018-12-19 RX ORDER — DEXAMETHASONE SODIUM PHOSPHATE 100 MG/10ML
10 INJECTION INTRAMUSCULAR; INTRAVENOUS
Status: COMPLETED | OUTPATIENT
Start: 2018-12-19 | End: 2018-12-19

## 2018-12-19 RX ORDER — FLUTICASONE PROPIONATE 50 MCG
2 SPRAY, SUSPENSION (ML) NASAL DAILY
Qty: 1 BOTTLE | Refills: 0 | Status: SHIPPED | OUTPATIENT
Start: 2018-12-19 | End: 2019-01-02

## 2018-12-19 RX ORDER — AMOXICILLIN AND CLAVULANATE POTASSIUM 875; 125 MG/1; MG/1
1 TABLET, FILM COATED ORAL 2 TIMES DAILY
Qty: 20 TAB | Refills: 0 | Status: SHIPPED | OUTPATIENT
Start: 2018-12-19 | End: 2018-12-29

## 2018-12-19 RX ORDER — PROCHLORPERAZINE EDISYLATE 5 MG/ML
10 INJECTION INTRAMUSCULAR; INTRAVENOUS
Status: COMPLETED | OUTPATIENT
Start: 2018-12-19 | End: 2018-12-19

## 2018-12-19 RX ORDER — DIPHENHYDRAMINE HCL 25 MG
25 CAPSULE ORAL
Status: COMPLETED | OUTPATIENT
Start: 2018-12-19 | End: 2018-12-19

## 2018-12-19 RX ORDER — IBUPROFEN 800 MG/1
800 TABLET ORAL
Qty: 20 TAB | Refills: 0 | Status: SHIPPED | OUTPATIENT
Start: 2018-12-19 | End: 2018-12-26

## 2018-12-19 RX ORDER — AMOXICILLIN AND CLAVULANATE POTASSIUM 875; 125 MG/1; MG/1
1 TABLET, FILM COATED ORAL
Status: COMPLETED | OUTPATIENT
Start: 2018-12-19 | End: 2018-12-19

## 2018-12-19 RX ADMIN — PROCHLORPERAZINE EDISYLATE 10 MG: 5 INJECTION INTRAMUSCULAR; INTRAVENOUS at 19:30

## 2018-12-19 RX ADMIN — DEXAMETHASONE SODIUM PHOSPHATE 10 MG: 10 INJECTION INTRAMUSCULAR; INTRAVENOUS at 19:30

## 2018-12-19 RX ADMIN — SODIUM CHLORIDE 1000 ML: 900 INJECTION, SOLUTION INTRAVENOUS at 19:30

## 2018-12-19 RX ADMIN — AMOXICILLIN AND CLAVULANATE POTASSIUM 1 TABLET: 875; 125 TABLET, FILM COATED ORAL at 21:31

## 2018-12-19 RX ADMIN — DIPHENHYDRAMINE HYDROCHLORIDE 25 MG: 25 CAPSULE ORAL at 19:30

## 2018-12-20 NOTE — ED NOTES
I have reviewed discharge instructions with the patient. The patient verbalized understanding. Patient left ED via Discharge Method: ambulatory to Home with family / friend. Opportunity for questions and clarification provided. Patient given 3 scripts. To continue your aftercare when you leave the hospital, you may receive an automated call from our care team to check in on how you are doing. This is a free service and part of our promise to provide the best care and service to meet your aftercare needs.  If you have questions, or wish to unsubscribe from this service please call [phone]. Thank you for Choosing our [facility] Emergency Department.

## 2018-12-20 NOTE — ED PROVIDER NOTES
HPI:  16-year-old female history of thyroid disease is here with headaches. Has been dealing with a sinusitis, nasal discharge, green for the past 2-3 days. Today started to get a headache that form a ring around the left side of her eye with associated headaches with associated light sensitivity. Denies any trauma. Typically does not get headaches. However this is progressive in nature and not severe acute onset. Not on blood thinner. No known family history of brain aneurysm. She denies any fever. Denies any trauma to the eyes    ROS  Constitutional: No fever, no chills  Skin: no rash  Eye: No vision changes  ENMT: No sore throat  Respiratory: No shortness of breath, no cough  Cardiovascular: No chest pain, no palpitations  Gastrointestinal: No vomiting, no nausea, no diarrhea, no abdominal pain  : No dysuria  MSK: No back pain, no muscle pain, no joint pain  Neuro: + headache, no change in mental status, no numbness, no tingling, no weakness  Psych:   Endocrine:   All other review of systems positive per history of present illness and the above otherwise negative or noncontributory. Visit Vitals  BP (!) 125/98 (BP 1 Location: Left arm, BP Patient Position: At rest)   Pulse 81   Temp 98.7 °F (37.1 °C)   Resp 18   Ht 5' 9\" (1.753 m)   Wt 82.1 kg (181 lb)   LMP 12/05/2018 (Exact Date)   SpO2 100%   BMI 26.73 kg/m²     Past Medical History:   Diagnosis Date    Abnormal Pap smear of cervix     Carcinoma of cervix ([facility] [address] 2003    H/O seasonal allergies     Ovarian cyst      Past Surgical History:   Procedure Laterality Date    HX CHOLECYSTECTOMY      HX COLPOSCOPY  2003    HX FREE SKIN GRAFT Right     LEG    HX GYN      vaginal delivery x 2    HX LEEP PROCEDURE  2003    MS DEBRIDEMENT OPEN WOUND 20 SQ CM< Right     LEG     Prior to Admission Medications   Prescriptions Last Dose Informant Patient Reported? Taking?    SYNTHROID 75 mcg tablet   No No   Sig: Take 1 Tab by mouth Daily (before breakfast). Indications: hypothyroidism   inhalational spacing device   No No   Si Each by Does Not Apply route as needed. loratadine (CLARITIN) 10 mg tablet   Yes No   Sig: Take 10 mg by mouth. Facility-Administered Medications: None         Adult Exam   General: alert, no acute distress  Head: normocephalic, atraumatic  ENT: moist mucous membranes  Boggy nasal turbinates bilaterally. Normal posterior pharynx. TM without signs of otitis media  Tenderness upon percussion over the left maxillary, frontal sinuses  Neck: supple, non-tender; full range of motion  Cardiovascular: regular rate and rhythm, normal peripheral perfusion, no edema  Respiratory:  normal respirations; no wheezing, rales or rhonchi  Gastrointestinal: soft, non-tender; no rebound or guarding, no peritoneal signs, no distension  Back: non-tender, full range of motion  Musculoskeletal: normal range of motion, normal strength, no gross deformities  Neurological: alert and oriented x 4, no gross focal deficits; normal speech. No pronator drift. Psychiatric: cooperative; appropriate mood and affect    MDM: differential diagnoses includes sinusitis, intracranial hemorrhage low likelihood, venous thrombosis although low likelihood without any obvious risk factor. She is not on birth control, exogenous estrogen. Tenderness on percussion. We'll obtain CT scan of the head. We'll give headache cocktail and reassess. 9:02 PM  Headache resolved. CT head obtained with left sinusitis. Consistent with exam and history. Low suspicion for venous thrombosis. Will give Augmentin  Twice a day for 10 days, recommend Sudafed over-the-counter for sinus congestion. Sinus rinse also recommended and Flonase. Motrin as needed for pain. Return precautions given. No further questions or concern. Do not suspect meningitis, intracranial hemorrhage, dissection, CVA, TIA, temporal arteritis at this time.        Recent Results (from the past 12 hour(s)) HCG URINE, QL. - POC    Collection Time: 12/19/18  7:14 PM   Result Value Ref Range    Pregnancy test,urine (POC) NEGATIVE  NEG     CBC WITH AUTOMATED DIFF    Collection Time: 12/19/18  7:36 PM   Result Value Ref Range    WBC 9.6 4.3 - 11.1 K/uL    RBC 4.08 4.05 - 5.2 M/uL    HGB 12.5 11.7 - 15.4 g/dL    HCT 38.3 35.8 - 46.3 %    MCV 93.9 79.6 - 97.8 FL    MCH 30.6 26.1 - 32.9 PG    MCHC 32.6 31.4 - 35.0 g/dL    RDW 12.3 11.9 - 14.6 %    PLATELET 514 940 - 320 K/uL    MPV 12.0 9.4 - 12.3 FL    ABSOLUTE NRBC 0.00 0.0 - 0.2 K/uL    DF AUTOMATED      NEUTROPHILS 51 43 - 78 %    LYMPHOCYTES 38 13 - 44 %    MONOCYTES 9 4.0 - 12.0 %    EOSINOPHILS 2 0.5 - 7.8 %    BASOPHILS 0 0.0 - 2.0 %    IMMATURE GRANULOCYTES 0 0.0 - 5.0 %    ABS. NEUTROPHILS 4.9 1.7 - 8.2 K/UL    ABS. LYMPHOCYTES 3.6 0.5 - 4.6 K/UL    ABS. MONOCYTES 0.8 0.1 - 1.3 K/UL    ABS. EOSINOPHILS 0.1 0.0 - 0.8 K/UL    ABS. BASOPHILS 0.0 0.0 - 0.2 K/UL    ABS. IMM. GRANS. 0.0 0.0 - 0.5 K/UL   METABOLIC PANEL, BASIC    Collection Time: 12/19/18  7:36 PM   Result Value Ref Range    Sodium 139 136 - 145 mmol/L    Potassium 3.9 3.5 - 5.1 mmol/L    Chloride 105 98 - 107 mmol/L    CO2 25 21 - 32 mmol/L    Anion gap 9 mmol/L    Glucose 90 65 - 100 mg/dL    BUN 12 6 - 23 MG/DL    Creatinine 0.91 0.6 - 1.0 MG/DL    GFR est AA >60 >60 ml/min/1.73m2    GFR est non-AA >60 ml/min/1.73m2    Calcium 8.9 8.3 - 10.4 MG/DL       Ct Head Wo Cont    Result Date: 12/19/2018  CT head without contrast History: left sided headache that started today. Light sensitivity Technique: 5mm axial images were obtained from the skull base to the vertex without intravenous contrast.  Radiation dose reduction techniques were used for this study:  Our CT scanners use one or all of the following: Automated exposure control, adjustment of the mA and/or kVp according to patient's size, iterative reconstruction.  Comparison: None Findings: The ventricles and sulci are normal in size and configuration. There are no extra-axial fluid collections. There is no evidence to suggest an acute major territorial infarct. There is no evidence of acute intraparenchymal hemorrhage or mass effect. The bony calvarium is intact. There is partially imaged moderate amount of layering fluid within the left maxillary sinus. There is mild mucosal thickening within left anterior ethmoid air cells. Impression: 1. Left-sided paranasal sinus disease with findings suspicious for acute maxillary sinusitis. 2. Otherwise unremarkable unenhanced CT scan of the brain. Dragon voice recognition software was used to create this note. Although the note has been reviewed and corrected where necessary, additional errors may have been overlooked and remain in the text.

## 2018-12-20 NOTE — DISCHARGE INSTRUCTIONS
Complete course of antibiotic. Take Sudafed over-the-counter. Use Flonase. Take Motrin 800 mg as needed for headaches. Return immediately if any other emergencies.

## 2019-03-13 PROBLEM — E03.9 ACQUIRED HYPOTHYROIDISM: Status: ACTIVE | Noted: 2019-03-13

## 2019-03-13 PROBLEM — K81.2 ACUTE CHOLECYSTITIS WITH CHRONIC CHOLECYSTITIS: Status: RESOLVED | Noted: 2018-03-08 | Resolved: 2019-03-13

## 2019-06-21 ENCOUNTER — HOSPITAL ENCOUNTER (OUTPATIENT)
Dept: MAMMOGRAPHY | Age: 44
Discharge: HOME | End: 2019-06-21
Attending: NURSE PRACTITIONER
Payer: COMMERCIAL

## 2019-06-21 DIAGNOSIS — Z12.31 SCREENING MAMMOGRAM, ENCOUNTER FOR: ICD-10-CM

## 2019-06-21 PROCEDURE — 77067 SCR MAMMO BI INCL CAD: CPT

## 2022-03-19 PROBLEM — E03.9 ACQUIRED HYPOTHYROIDISM: Status: ACTIVE | Noted: 2019-03-13

## 2024-03-25 NOTE — PROGRESS NOTES
Patient presents today for a routine gynecological examination with no complaints.    OB History          4    Para        Term   2            AB   2    Living   2         SAB        IAB        Ectopic        Molar        Multiple        Live Births                      GYN History   Last Pap Exam: 2019; Results: Negative; HPV: Negative  Mammogram: 2019; Results: Negative      No LMP recorded. Cycle Length {Numbers; 0-100:84136} Lasting {Numbers; 0-10:18683}  {Pos/neg trace:49232} dysmenorrhea; {Pos/neg trace:88978} postcoital bleeding    Past Medical History:  Past Medical History:   Diagnosis Date    Abnormal Pap smear of cervix     Carcinoma of cervix (HCC)     H/O seasonal allergies     Hypothyroid     Ovarian cyst        Past Surgical History:  Past Surgical History:   Procedure Laterality Date    CHOLECYSTECTOMY      COLPOSCOPY      DEBRIDEMENT OPEN WOUND 20 SQ CM< Right     LEG    LEEP  2003    SKIN GRAFT Right     LEG       Allergies:   Allergies   Allergen Reactions    Gluten Meal Diarrhea     Food     Egg White (Egg Protein) Other (See Comments)    Milk (Cow) Other (See Comments)    Chlorhexidine Rash    Codeine Anxiety       Medication History:  Current Outpatient Medications   Medication Sig Dispense Refill    levothyroxine (SYNTHROID) 88 MCG tablet Take 88 mcg by mouth every morning (before breakfast)      loratadine (CLARITIN) 10 MG tablet Take 10 mg by mouth       No current facility-administered medications for this visit.       Social History:  Social History     Socioeconomic History    Marital status:      Spouse name: Not on file    Number of children: Not on file    Years of education: Not on file    Highest education level: Not on file   Occupational History    Not on file   Tobacco Use    Smoking status: Never    Smokeless tobacco: Never   Substance and Sexual Activity    Alcohol use: No    Drug use: No    Sexual activity: Not on file   Other Topics

## 2024-03-27 ENCOUNTER — OFFICE VISIT (OUTPATIENT)
Dept: OBGYN CLINIC | Age: 49
End: 2024-03-27
Payer: COMMERCIAL

## 2024-03-27 VITALS
DIASTOLIC BLOOD PRESSURE: 72 MMHG | HEIGHT: 68 IN | WEIGHT: 219.6 LBS | SYSTOLIC BLOOD PRESSURE: 115 MMHG | BODY MASS INDEX: 33.28 KG/M2

## 2024-03-27 DIAGNOSIS — R53.83 OTHER FATIGUE: ICD-10-CM

## 2024-03-27 DIAGNOSIS — N92.1 MENORRHAGIA WITH IRREGULAR CYCLE: ICD-10-CM

## 2024-03-27 DIAGNOSIS — N92.6 IRREGULAR BLEEDING: ICD-10-CM

## 2024-03-27 DIAGNOSIS — Z13.89 SCREENING FOR GENITOURINARY CONDITION: Primary | ICD-10-CM

## 2024-03-27 DIAGNOSIS — N94.6 DYSMENORRHEA: ICD-10-CM

## 2024-03-27 DIAGNOSIS — R41.89 BRAIN FOG: ICD-10-CM

## 2024-03-27 LAB
25(OH)D3 SERPL-MCNC: 25.7 NG/ML (ref 30–100)
BASOPHILS # BLD: 0.1 K/UL (ref 0–0.2)
BASOPHILS NFR BLD: 1 % (ref 0–2)
BILIRUBIN, URINE, POC: NEGATIVE
BLOOD URINE, POC: NORMAL
DIFFERENTIAL METHOD BLD: ABNORMAL
EOSINOPHIL # BLD: 0.2 K/UL (ref 0–0.8)
EOSINOPHIL NFR BLD: 3 % (ref 0.5–7.8)
ERYTHROCYTE [DISTWIDTH] IN BLOOD BY AUTOMATED COUNT: 12.3 % (ref 11.9–14.6)
FSH SERPL-ACNC: 10.4 MIU/ML
GLUCOSE URINE, POC: NEGATIVE
HCT VFR BLD AUTO: 41.7 % (ref 35.8–46.3)
HGB BLD-MCNC: 13.2 G/DL (ref 11.7–15.4)
IMM GRANULOCYTES # BLD AUTO: 0 K/UL (ref 0–0.5)
IMM GRANULOCYTES NFR BLD AUTO: 0 % (ref 0–5)
KETONES, URINE, POC: NEGATIVE
LEUKOCYTE ESTERASE, URINE, POC: NEGATIVE
LYMPHOCYTES # BLD: 3.7 K/UL (ref 0.5–4.6)
LYMPHOCYTES NFR BLD: 45 % (ref 13–44)
MCH RBC QN AUTO: 30.3 PG (ref 26.1–32.9)
MCHC RBC AUTO-ENTMCNC: 31.7 G/DL (ref 31.4–35)
MCV RBC AUTO: 95.6 FL (ref 82–102)
MONOCYTES # BLD: 0.6 K/UL (ref 0.1–1.3)
MONOCYTES NFR BLD: 7 % (ref 4–12)
NEUTS SEG # BLD: 3.5 K/UL (ref 1.7–8.2)
NEUTS SEG NFR BLD: 44 % (ref 43–78)
NITRITE, URINE, POC: NEGATIVE
NRBC # BLD: 0 K/UL (ref 0–0.2)
PH, URINE, POC: 6 (ref 4.6–8)
PLATELET # BLD AUTO: 237 K/UL (ref 150–450)
PMV BLD AUTO: 12.7 FL (ref 9.4–12.3)
PROTEIN,URINE, POC: NEGATIVE
RBC # BLD AUTO: 4.36 M/UL (ref 4.05–5.2)
SPECIFIC GRAVITY, URINE, POC: 1.02 (ref 1–1.03)
T4 FREE SERPL-MCNC: 1.2 NG/DL (ref 0.78–1.46)
TSH, 3RD GENERATION: 2.05 UIU/ML (ref 0.36–3.74)
URINALYSIS CLARITY, POC: CLEAR
URINALYSIS COLOR, POC: YELLOW
UROBILINOGEN, POC: NORMAL
WBC # BLD AUTO: 8.1 K/UL (ref 4.3–11.1)

## 2024-03-27 PROCEDURE — 99204 OFFICE O/P NEW MOD 45 MIN: CPT | Performed by: NURSE PRACTITIONER

## 2024-03-27 PROCEDURE — 81002 URINALYSIS NONAUTO W/O SCOPE: CPT | Performed by: NURSE PRACTITIONER

## 2024-03-27 RX ORDER — PERPHENAZINE 4 MG
TABLET ORAL
COMMUNITY

## 2024-03-27 NOTE — PROGRESS NOTES
The patient is a 48 y.o. No obstetric history on file. who is seen for irregular menses/dysmenrrhea. Notes will occasionally skip cycles and then when cycles come on may bleed for 2 weeks at a time. On heavy days of cycle will change pad every 2 hours. First 2-3 days are heaviest. Changes began approx 1 year ago. States one month may bleed heavily, but have no pain. The next month may have a light bleed, but with extensive pelvic discomfort. Describes pain as unilateral, but sometimes on L and sometimes on R. Notes pain is sharp.  Notes increased fatigue/brain fog/joint pain. She has always attributed her joint pain to her autoimmune disorder. Also notes h/o hypothyroidism so has always attributed symptoms of  fatigue and brain fog to this. Notes it occurred to her could be secondary to menopause also, so wanted to discuss     HISTORY:    No obstetric history on file.  Patient's last menstrual period was 02/27/2024 (exact date).  Sexual History:  single partner, contraception - none  Contraception:  none  Current Outpatient Medications on File Prior to Visit   Medication Sig Dispense Refill    Turmeric (QC TUMERIC COMPLEX PO) Take by mouth      Collagen-Vitamin C-Biotin (COLLAGEN 1500/C) 500-50-0.8 MG CAPS Take by mouth      levothyroxine (SYNTHROID) 88 MCG tablet Take 1 tablet by mouth every morning (before breakfast)      loratadine (CLARITIN) 10 MG tablet Take 1 tablet by mouth       No current facility-administered medications on file prior to visit.       ROS:  Feeling well. No dyspnea or chest pain on exertion.  No abdominal pain, change in bowel habits, black or bloody stools.  No urinary tract symptoms. GYN ROS: see above.    PHYSICAL EXAM:  Blood pressure 115/72, height 1.727 m (5' 8\"), weight 99.6 kg (219 lb 9.6 oz), last menstrual period 02/27/2024.    The patient appears well, alert, oriented x 3, in no distress.    ASSESSMENT:  Encounter Diagnoses   Name Primary?    Screening for genitourinary condition

## 2024-03-28 RX ORDER — ERGOCALCIFEROL 1.25 MG/1
50000 CAPSULE ORAL WEEKLY
Qty: 8 CAPSULE | Refills: 0 | OUTPATIENT
Start: 2024-03-28

## 2024-04-05 ENCOUNTER — TELEPHONE (OUTPATIENT)
Dept: OBGYN CLINIC | Age: 49
End: 2024-04-05

## 2024-04-08 NOTE — TELEPHONE ENCOUNTER
From: BERNARDA SALEEM  To: Xochitl Martinez  Sent: 4/5/2024 9:01 AM EDT  Subject: Pharmacy    Good morning,    What is your preferred pharmacy? We do not have one saved in our system for some reason.    Thanks,  Maxi JONES

## 2024-04-10 ENCOUNTER — OFFICE VISIT (OUTPATIENT)
Dept: OBGYN CLINIC | Age: 49
End: 2024-04-10
Payer: COMMERCIAL

## 2024-04-10 ENCOUNTER — PATIENT MESSAGE (OUTPATIENT)
Dept: OBGYN CLINIC | Age: 49
End: 2024-04-10

## 2024-04-10 ENCOUNTER — PROCEDURE VISIT (OUTPATIENT)
Dept: OBGYN CLINIC | Age: 49
End: 2024-04-10
Payer: COMMERCIAL

## 2024-04-10 VITALS — BODY MASS INDEX: 33.59 KG/M2 | WEIGHT: 220.9 LBS | SYSTOLIC BLOOD PRESSURE: 138 MMHG | DIASTOLIC BLOOD PRESSURE: 80 MMHG

## 2024-04-10 DIAGNOSIS — N92.1 MENORRHAGIA WITH IRREGULAR CYCLE: Primary | ICD-10-CM

## 2024-04-10 DIAGNOSIS — N94.6 DYSMENORRHEA: ICD-10-CM

## 2024-04-10 DIAGNOSIS — N92.6 IRREGULAR MENSES: Primary | ICD-10-CM

## 2024-04-10 PROCEDURE — 76830 TRANSVAGINAL US NON-OB: CPT | Performed by: OBSTETRICS & GYNECOLOGY

## 2024-04-10 PROCEDURE — 99214 OFFICE O/P EST MOD 30 MIN: CPT | Performed by: NURSE PRACTITIONER

## 2024-04-10 RX ORDER — ERGOCALCIFEROL 1.25 MG/1
50000 CAPSULE ORAL WEEKLY
Qty: 8 CAPSULE | Refills: 0 | Status: SHIPPED | OUTPATIENT
Start: 2024-04-10

## 2024-04-10 RX ORDER — NORETHINDRONE ACETATE AND ETHINYL ESTRADIOL AND FERROUS FUMARATE 1MG-20(24)
1 KIT ORAL DAILY
Qty: 3 PACKET | Refills: 3 | Status: SHIPPED | OUTPATIENT
Start: 2024-04-10 | End: 2024-04-10 | Stop reason: SDUPTHER

## 2024-04-10 RX ORDER — NORETHINDRONE ACETATE AND ETHINYL ESTRADIOL AND FERROUS FUMARATE 1MG-20(24)
1 KIT ORAL DAILY
Qty: 3 PACKET | Refills: 3 | Status: SHIPPED | OUTPATIENT
Start: 2024-04-10

## 2024-04-10 RX ORDER — ERGOCALCIFEROL 1.25 MG/1
50000 CAPSULE ORAL WEEKLY
Qty: 8 CAPSULE | Refills: 0 | Status: SHIPPED | OUTPATIENT
Start: 2024-04-10 | End: 2024-04-10 | Stop reason: SDUPTHER

## 2024-04-10 NOTE — TELEPHONE ENCOUNTER
From: Xochitl Martinez  To: Davina Mireles  Sent: 4/10/2024 3:07 PM EDT  Subject: Scripts sent in this morning     Good afternoon dear. I hope that you’ve had a good day.  I called [facility] to see when the medicines would be ready for , and they say that they haven’t received anything today.  Could you possibly resend them?

## 2024-04-10 NOTE — PROGRESS NOTES
The patient is a 48 y.o. No obstetric history on file. who is seen for US to follow up on irregular menses/dysmenrrhea. Notes will occasionally skip cycles and then when cycles come on may bleed for 2 weeks at a time. On heavy days of cycle will change pad every 2 hours. First 2-3 days are heaviest. Changes began approx 1 year ago. States one month may bleed heavily, but have no pain. The next month may have a light bleed, but with extensive pelvic discomfort. Describes pain as unilateral, but sometimes on L and sometimes on R. Notes pain is sharp.  Notes increased fatigue/brain fog/joint pain. She has always attributed her joint pain to her autoimmune disorder. Also notes h/o hypothyroidism so has always attributed symptoms of  fatigue and brain fog to this. Notes it occurred to her could be secondary to menopause also, so wanted to discuss     US today:          HISTORY:    No obstetric history on file.  Patient's last menstrual period was 02/27/2024 (exact date).  Sexual History:  see above  Contraception:  none  Current Outpatient Medications on File Prior to Visit   Medication Sig Dispense Refill    Turmeric (QC TUMERIC COMPLEX PO) Take by mouth      Collagen-Vitamin C-Biotin (COLLAGEN 1500/C) 500-50-0.8 MG CAPS Take by mouth      levothyroxine (SYNTHROID) 88 MCG tablet Take 1 tablet by mouth every morning (before breakfast)      loratadine (CLARITIN) 10 MG tablet Take 1 tablet by mouth       No current facility-administered medications on file prior to visit.       ROS:  Feeling well. No dyspnea or chest pain on exertion.  No abdominal pain, change in bowel habits, black or bloody stools.  No urinary tract symptoms. GYN ROS: see above.    PHYSICAL EXAM:  Blood pressure 138/80, weight 100.2 kg (220 lb 14.4 oz), last menstrual period 02/27/2024.    The patient appears well, alert, oriented x 3, in no distress.    ASSESSMENT:  Encounter Diagnosis   Name Primary?    Irregular menses Yes       PLAN:  All questions

## 2024-04-10 NOTE — RESULT ENCOUNTER NOTE
Ultrasound shows uterus of normal size and endometrium of 9 mm.  Right ovary is visualized with a proximately 2 cm cyst left ovary has a 1 and half centimeter cyst both appear physiologic.  No adnexal mass or fluid is seen.  Please see full report under imaging  Impression normal uterus.  Bilateral physiologic ovarian cyst.

## 2024-07-09 NOTE — PROGRESS NOTES
The patient is a 49 y.o. presents to the office for a follow up visit from 4/10/24 for medication recheck. Pt was prescribed Junel 24/4 to help with irregular menses/mood instability and recurrent issues with ruptured cysts. Pt states she has seen significant improvements as of today and is feeling much better. No current complaints. States medication is doing well. BP slightly elevated today. States under significant amount of stress with work and feels is very much situational.    HISTORY:    Patient's last menstrual period was 2024.  Sexual History:  has sex with males  Contraception:  OCP (estrogen/progesterone)  Current Outpatient Medications on File Prior to Visit   Medication Sig Dispense Refill    Norethin Ace-Eth Estrad-FE (JUNEL FE 24) 1-20 MG-MCG(24) TABS Take 1 tablet by mouth daily 3 packet 3    vitamin D (ERGOCALCIFEROL) 1.25 MG (18869 UT) CAPS capsule Take 1 capsule by mouth once a week 8 capsule 0    Turmeric (QC TUMERIC COMPLEX PO) Take by mouth      Collagen-Vitamin C-Biotin (COLLAGEN 1500/C) 500-50-0.8 MG CAPS Take by mouth      levothyroxine (SYNTHROID) 88 MCG tablet Take 1 tablet by mouth every morning (before breakfast)      loratadine (CLARITIN) 10 MG tablet Take 1 tablet by mouth       No current facility-administered medications on file prior to visit.       ROS:  Feeling well. No dyspnea or chest pain on exertion.  No abdominal pain, change in bowel habits, black or bloody stools.  No urinary tract symptoms. GYN ROS: see above.    PHYSICAL EXAM:  Blood pressure (!) 146/90, height 1.727 m (5' 8\"), weight 98.4 kg (216 lb 14.4 oz), last menstrual period 2024.    The patient appears well, alert, oriented x 3, in no distress.    ASSESSMENT:  Encounter Diagnoses   Name Primary?    Follow-up encounter involving medication Yes    Irregular menses     Irritability     Mittelschmerz phenomenon        PLAN:  All questions answered  Spent a great deal of time discussing above noted

## 2024-07-10 ENCOUNTER — OFFICE VISIT (OUTPATIENT)
Dept: OBGYN CLINIC | Age: 49
End: 2024-07-10
Payer: COMMERCIAL

## 2024-07-10 VITALS
HEIGHT: 68 IN | BODY MASS INDEX: 32.87 KG/M2 | WEIGHT: 216.9 LBS | DIASTOLIC BLOOD PRESSURE: 92 MMHG | SYSTOLIC BLOOD PRESSURE: 158 MMHG

## 2024-07-10 DIAGNOSIS — N94.0 MITTELSCHMERZ PHENOMENON: ICD-10-CM

## 2024-07-10 DIAGNOSIS — Z79.899 FOLLOW-UP ENCOUNTER INVOLVING MEDICATION: Primary | ICD-10-CM

## 2024-07-10 DIAGNOSIS — N92.6 IRREGULAR MENSES: ICD-10-CM

## 2024-07-10 DIAGNOSIS — R45.4 IRRITABILITY: ICD-10-CM

## 2024-07-10 PROCEDURE — 99214 OFFICE O/P EST MOD 30 MIN: CPT | Performed by: NURSE PRACTITIONER

## 2024-08-19 NOTE — PROGRESS NOTES
The patient is a 49 y.o. who presents to the office for a follow up visit for BP check from 7/10/24. Pt had elevated BP reading (158/92) at last visit but felt it was situational due to stress from work.     HISTORY:    Patient's last menstrual period was 06/20/2024 (approximate).  Sexual History:  has sex with males  Contraception:  OCP (estrogen/progesterone)    Current Outpatient Medications on File Prior to Visit   Medication Sig Dispense Refill    Norethin Ace-Eth Estrad-FE (JUNEL FE 24) 1-20 MG-MCG(24) TABS Take 1 tablet by mouth daily 3 packet 3    vitamin D (ERGOCALCIFEROL) 1.25 MG (80968 UT) CAPS capsule Take 1 capsule by mouth once a week 8 capsule 0    Turmeric (QC TUMERIC COMPLEX PO) Take by mouth      Collagen-Vitamin C-Biotin (COLLAGEN 1500/C) 500-50-0.8 MG CAPS Take by mouth      levothyroxine (SYNTHROID) 88 MCG tablet Take 1 tablet by mouth every morning (before breakfast)      loratadine (CLARITIN) 10 MG tablet Take 1 tablet by mouth       No current facility-administered medications on file prior to visit.       ROS:  Feeling well. No dyspnea or chest pain on exertion.  No abdominal pain, change in bowel habits, black or bloody stools.  No urinary tract symptoms. GYN ROS: {gyn ros:036265}.    PHYSICAL EXAM:  Blood pressure (!) 144/90, height 1.727 m (5' 8\"), weight 98.7 kg (217 lb 8 oz), last menstrual period 06/20/2024.    The patient appears well, alert, oriented x 3, in no distress.  Lungs are clear. Heart RRR, no murmurs. Abdomen soft without tenderness, guarding, mass or organomegaly.  Pelvic: {pelvic exam:826191::\"normal external genitalia, vulva, vagina, cervix, uterus and adnexa\"}.    ASSESSMENT:  No diagnosis found.    PLAN:  {Gyn plan:78965}  No orders of the defined types were placed in this encounter.        Supervising physician is  ****.  Greater than 50% of the *** minute visit were spent in counseling to the above topics.

## 2024-08-20 ENCOUNTER — OFFICE VISIT (OUTPATIENT)
Dept: OBGYN CLINIC | Age: 49
End: 2024-08-20
Payer: COMMERCIAL

## 2024-08-20 VITALS
HEIGHT: 68 IN | BODY MASS INDEX: 32.96 KG/M2 | WEIGHT: 217.5 LBS | SYSTOLIC BLOOD PRESSURE: 144 MMHG | DIASTOLIC BLOOD PRESSURE: 90 MMHG

## 2024-08-20 DIAGNOSIS — N92.6 IRREGULAR MENSES: ICD-10-CM

## 2024-08-20 DIAGNOSIS — R03.0 ELEVATED BP WITHOUT DIAGNOSIS OF HYPERTENSION: Primary | ICD-10-CM

## 2024-08-20 PROCEDURE — 99213 OFFICE O/P EST LOW 20 MIN: CPT | Performed by: NURSE PRACTITIONER

## 2024-08-20 NOTE — PROGRESS NOTES
The patient is a 49 y.o. who presents to the office for a follow up visit for BP check from 7/10/24. Pt had elevated BP reading (158/92) at last visit but felt it was situational due to stress from work. Has since left previous position and feels stress levels improved. Remains asymptomatic. On Junel 1/20. 2    HISTORY:    Patient's last menstrual period was 06/20/2024 (approximate).  Sexual History:  has sex with males  Contraception:  OCP (estrogen/progesterone)    Current Outpatient Medications on File Prior to Visit   Medication Sig Dispense Refill    Norethin Ace-Eth Estrad-FE (JUNEL FE 24) 1-20 MG-MCG(24) TABS Take 1 tablet by mouth daily 3 packet 3    vitamin D (ERGOCALCIFEROL) 1.25 MG (14425 UT) CAPS capsule Take 1 capsule by mouth once a week 8 capsule 0    Turmeric (QC TUMERIC COMPLEX PO) Take by mouth      Collagen-Vitamin C-Biotin (COLLAGEN 1500/C) 500-50-0.8 MG CAPS Take by mouth      levothyroxine (SYNTHROID) 88 MCG tablet Take 1 tablet by mouth every morning (before breakfast)      loratadine (CLARITIN) 10 MG tablet Take 1 tablet by mouth       No current facility-administered medications on file prior to visit.       ROS:  Feeling well. No dyspnea or chest pain on exertion.  No abdominal pain, change in bowel habits, black or bloody stools.  No urinary tract symptoms. GYN ROS: see above.    PHYSICAL EXAM:  Blood pressure (!) 144/90, height 1.727 m (5' 8\"), weight 98.7 kg (217 lb 8 oz), last menstrual period 06/20/2024.    The patient appears well, alert, oriented x 3, in no distress.    ASSESSMENT:  Encounter Diagnoses   Name Primary?    Elevated BP without diagnosis of hypertension Yes    Irregular menses        PLAN:  All questions answered  BP remains elevated  As pt is anxious about POP will start with lolo and bring back in 6 weeks for med check  Discussed 3mo to reach max efficacy, but should see BP improvements by follow up  Would also like her to follow up with PCP regarding BP

## 2024-10-01 ENCOUNTER — OFFICE VISIT (OUTPATIENT)
Dept: OBGYN CLINIC | Age: 49
End: 2024-10-01
Payer: COMMERCIAL

## 2024-10-01 VITALS
BODY MASS INDEX: 32.8 KG/M2 | SYSTOLIC BLOOD PRESSURE: 146 MMHG | DIASTOLIC BLOOD PRESSURE: 86 MMHG | HEIGHT: 68 IN | WEIGHT: 216.4 LBS

## 2024-10-01 DIAGNOSIS — R03.0 ELEVATED BP WITHOUT DIAGNOSIS OF HYPERTENSION: Primary | ICD-10-CM

## 2024-10-01 PROCEDURE — 99213 OFFICE O/P EST LOW 20 MIN: CPT | Performed by: NURSE PRACTITIONER
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
## 2025-03-27 NOTE — PROGRESS NOTES
The patient is a 49 y.o.  who is seen for 6 month recheck BP. Was previously on Junel 1/20 for ovarian suppression/cycle control. Pt noted increased BP on Junel, so was instructed to follow up with PCP and was canged to dionisiolo. Pt did not start on the new OCP due to issues with her pharmacy. Has been on no form of hormonal contraception and BP today 140/80. Notes occasional bounding pulse but otherwise asymptomatic.     HISTORY:      Patient's last menstrual period was 2025.  Sexual History:  has sex with males  Contraception:  none  Current Outpatient Medications on File Prior to Visit   Medication Sig Dispense Refill    levothyroxine (SYNTHROID) 88 MCG tablet Take 1 tablet by mouth every morning (before breakfast)      loratadine (CLARITIN) 10 MG tablet Take 1 tablet by mouth       No current facility-administered medications on file prior to visit.       ROS:  Feeling well. No dyspnea or chest pain on exertion.  No abdominal pain, change in bowel habits, black or bloody stools.  No urinary tract symptoms. GYN ROS: see above.    PHYSICAL EXAM:  Blood pressure (!) 140/80, height 1.727 m (5' 8\"), weight 99.2 kg (218 lb 11.2 oz), last menstrual period 2025.    The patient appears well, alert, oriented x 3, in no distress.    ASSESSMENT:  Encounter Diagnoses   Name Primary?    Irregular menses Yes    Menorrhagia with irregular cycle     Mittelschmerz phenomenon     Elevated BP without diagnosis of hypertension        PLAN:  All questions answered  Spent a great deal of time discussing above noted concerns/plan of care  Discussed at this time, as BP not controlled OFF of medication, recommend progestin only   Discussed as she's not concerned with pregnancy prevention, will send rx for aygestin  Discussed this is not to be relied on for contraception and unfortunately not the greatest ovarian suppression  Encouraged to follow up with PCP  Will schedule WWE in 3mo and assess response to new rx at

## 2025-03-31 ENCOUNTER — APPOINTMENT (OUTPATIENT)
Dept: GENERAL RADIOLOGY | Age: 50
End: 2025-03-31
Payer: COMMERCIAL

## 2025-03-31 ENCOUNTER — HOSPITAL ENCOUNTER (EMERGENCY)
Age: 50
Discharge: HOME | End: 2025-03-31
Payer: COMMERCIAL

## 2025-03-31 ENCOUNTER — OFFICE VISIT (OUTPATIENT)
Dept: OBGYN CLINIC | Age: 50
End: 2025-03-31
Payer: COMMERCIAL

## 2025-03-31 VITALS
HEIGHT: 68 IN | BODY MASS INDEX: 33.15 KG/M2 | WEIGHT: 218.7 LBS | SYSTOLIC BLOOD PRESSURE: 140 MMHG | DIASTOLIC BLOOD PRESSURE: 80 MMHG

## 2025-03-31 VITALS
BODY MASS INDEX: 33.04 KG/M2 | HEART RATE: 70 BPM | DIASTOLIC BLOOD PRESSURE: 78 MMHG | OXYGEN SATURATION: 100 % | HEIGHT: 68 IN | WEIGHT: 218 LBS | SYSTOLIC BLOOD PRESSURE: 134 MMHG | RESPIRATION RATE: 17 BRPM | TEMPERATURE: 98.2 F

## 2025-03-31 DIAGNOSIS — N92.1 MENORRHAGIA WITH IRREGULAR CYCLE: ICD-10-CM

## 2025-03-31 DIAGNOSIS — N94.0 MITTELSCHMERZ PHENOMENON: ICD-10-CM

## 2025-03-31 DIAGNOSIS — R03.0 ELEVATED BP WITHOUT DIAGNOSIS OF HYPERTENSION: ICD-10-CM

## 2025-03-31 DIAGNOSIS — N92.6 IRREGULAR MENSES: Primary | ICD-10-CM

## 2025-03-31 DIAGNOSIS — R06.09 DYSPNEA ON EXERTION: Primary | ICD-10-CM

## 2025-03-31 LAB
ALBUMIN SERPL-MCNC: 3.7 G/DL (ref 3.5–5)
ALBUMIN/GLOB SERPL: 1 (ref 1–1.9)
ALP SERPL-CCNC: 52 U/L (ref 35–104)
ALT SERPL-CCNC: 19 U/L (ref 8–45)
ANION GAP SERPL CALC-SCNC: 12 MMOL/L (ref 7–16)
AST SERPL-CCNC: 18 U/L (ref 15–37)
BASOPHILS # BLD: 0.05 K/UL (ref 0–0.2)
BASOPHILS NFR BLD: 0.6 % (ref 0–2)
BILIRUB SERPL-MCNC: 0.3 MG/DL (ref 0–1.2)
BUN SERPL-MCNC: 10 MG/DL (ref 6–23)
CALCIUM SERPL-MCNC: 9.7 MG/DL (ref 8.8–10.2)
CHLORIDE SERPL-SCNC: 105 MMOL/L (ref 98–107)
CO2 SERPL-SCNC: 23 MMOL/L (ref 20–29)
CREAT SERPL-MCNC: 0.92 MG/DL (ref 0.6–1.1)
D DIMER PPP FEU-MCNC: <0.27 UG/ML(FEU)
DIFFERENTIAL METHOD BLD: NORMAL
EKG ATRIAL RATE: 67 BPM
EKG DIAGNOSIS: NORMAL
EKG P AXIS: 46 DEGREES
EKG P-R INTERVAL: 149 MS
EKG Q-T INTERVAL: 391 MS
EKG QRS DURATION: 104 MS
EKG QTC CALCULATION (BAZETT): 413 MS
EKG R AXIS: 10 DEGREES
EKG T AXIS: 46 DEGREES
EKG VENTRICULAR RATE: 67 BPM
EOSINOPHIL # BLD: 0.19 K/UL (ref 0–0.8)
EOSINOPHIL NFR BLD: 2.2 % (ref 0.5–7.8)
ERYTHROCYTE [DISTWIDTH] IN BLOOD BY AUTOMATED COUNT: 12.6 % (ref 11.9–14.6)
GLOBULIN SER CALC-MCNC: 3.7 G/DL (ref 2.3–3.5)
GLUCOSE SERPL-MCNC: 98 MG/DL (ref 70–99)
HCT VFR BLD AUTO: 42.7 % (ref 35.8–46.3)
HGB BLD-MCNC: 13.6 G/DL (ref 11.7–15.4)
IMM GRANULOCYTES # BLD AUTO: 0.02 K/UL (ref 0–0.5)
IMM GRANULOCYTES NFR BLD AUTO: 0.2 % (ref 0–5)
LIPASE SERPL-CCNC: 31 U/L (ref 13–60)
LYMPHOCYTES # BLD: 2.83 K/UL (ref 0.5–4.6)
LYMPHOCYTES NFR BLD: 33.5 % (ref 13–44)
MCH RBC QN AUTO: 30 PG (ref 26.1–32.9)
MCHC RBC AUTO-ENTMCNC: 31.9 G/DL (ref 31.4–35)
MCV RBC AUTO: 94.1 FL (ref 82–102)
MONOCYTES # BLD: 0.6 K/UL (ref 0.1–1.3)
MONOCYTES NFR BLD: 7.1 % (ref 4–12)
NEUTS SEG # BLD: 4.76 K/UL (ref 1.7–8.2)
NEUTS SEG NFR BLD: 56.4 % (ref 43–78)
NRBC # BLD: 0 K/UL (ref 0–0.2)
NT PRO BNP: 105 PG/ML (ref 0–125)
PLATELET # BLD AUTO: 255 K/UL (ref 150–450)
PMV BLD AUTO: 11.3 FL (ref 9.4–12.3)
POTASSIUM SERPL-SCNC: 4.1 MMOL/L (ref 3.5–5.1)
PROT SERPL-MCNC: 7.5 G/DL (ref 6.3–8.2)
RBC # BLD AUTO: 4.54 M/UL (ref 4.05–5.2)
SODIUM SERPL-SCNC: 140 MMOL/L (ref 136–145)
TROPONIN T SERPL HS-MCNC: 6.2 NG/L (ref 0–14)
TROPONIN T SERPL HS-MCNC: <6 NG/L (ref 0–14)
TSH W FREE THYROID IF ABNORMAL: 3.65 UIU/ML (ref 0.27–4.2)
WBC # BLD AUTO: 8.5 K/UL (ref 4.3–11.1)

## 2025-03-31 PROCEDURE — 84443 ASSAY THYROID STIM HORMONE: CPT

## 2025-03-31 PROCEDURE — 71046 X-RAY EXAM CHEST 2 VIEWS: CPT

## 2025-03-31 PROCEDURE — 93005 ELECTROCARDIOGRAM TRACING: CPT

## 2025-03-31 PROCEDURE — 93010 ELECTROCARDIOGRAM REPORT: CPT | Performed by: INTERNAL MEDICINE

## 2025-03-31 PROCEDURE — 85025 COMPLETE CBC W/AUTO DIFF WBC: CPT

## 2025-03-31 PROCEDURE — 80053 COMPREHEN METABOLIC PANEL: CPT

## 2025-03-31 PROCEDURE — 99285 EMERGENCY DEPT VISIT HI MDM: CPT

## 2025-03-31 PROCEDURE — 84484 ASSAY OF TROPONIN QUANT: CPT

## 2025-03-31 PROCEDURE — 85379 FIBRIN DEGRADATION QUANT: CPT

## 2025-03-31 PROCEDURE — 83880 ASSAY OF NATRIURETIC PEPTIDE: CPT

## 2025-03-31 PROCEDURE — 99214 OFFICE O/P EST MOD 30 MIN: CPT | Performed by: NURSE PRACTITIONER

## 2025-03-31 PROCEDURE — 83690 ASSAY OF LIPASE: CPT

## 2025-03-31 RX ORDER — NORETHINDRONE 5 MG/1
10 TABLET ORAL DAILY
Qty: 60 TABLET | Refills: 11 | Status: SHIPPED | OUTPATIENT
Start: 2025-03-31

## 2025-03-31 ASSESSMENT — LIFESTYLE VARIABLES
HOW MANY STANDARD DRINKS CONTAINING ALCOHOL DO YOU HAVE ON A TYPICAL DAY: 1 OR 2
HOW OFTEN DO YOU HAVE A DRINK CONTAINING ALCOHOL: MONTHLY OR LESS

## 2025-03-31 ASSESSMENT — ENCOUNTER SYMPTOMS
CHOKING: 0
SHORTNESS OF BREATH: 1
APNEA: 0
WHEEZING: 0
COUGH: 0
STRIDOR: 0
CHEST TIGHTNESS: 0
ABDOMINAL PAIN: 0

## 2025-03-31 ASSESSMENT — PAIN - FUNCTIONAL ASSESSMENT: PAIN_FUNCTIONAL_ASSESSMENT: NONE - DENIES PAIN

## 2025-03-31 ASSESSMENT — VISUAL ACUITY: OU: 1

## 2025-03-31 NOTE — DISCHARGE INSTRUCTIONS
As discussed your workup today in the emergency department is reassuring.  Please follow-up with your primary care provider regarding your shortness of breath.  Continue to rest as needed.  If your symptoms change or worsen please return to the emergency department.

## 2025-03-31 NOTE — DISCHARGE INSTR - COC
Continuity of Care Form    Patient Name: Xochitl Martinez   :  1975  MRN:  [id number]    Admit date:  3/31/2025  Discharge date:  ***    Code Status Order: No Order   Advance Directives:     Admitting Physician:  No admitting provider for patient encounter.  PCP: Nilesh Bhatia APRN - NP    Discharging Nurse: ***  Discharging Hospital Unit/Room#: ER05/05  Discharging Unit Phone Number: ***    Emergency Contact:   Extended Emergency Contact Information  Primary Emergency Contact: Bashir Martinez  Address: [address] [place] [id number] [place]  Mobile Phone: [phone]  Relation: Spouse    Past Surgical History:  Past Surgical History:   Procedure Laterality Date    CHOLECYSTECTOMY      COLPOSCOPY      DEBRIDEMENT OPEN WOUND 20 SQ CM< Right     LEG    LEEP  2003    SKIN GRAFT Right     LEG       Immunization History:   Immunization History   Administered Date(s) Administered    TDaP, ADACEL (age 10y-64y), BOOSTRIX (age 10y+), IM, 0.5mL 2011       Active Problems:  Patient Active Problem List   Diagnosis Code    Ovarian cyst N83.209    Carcinoma of cervix (HCC) C53.9    Acquired hypothyroidism E03.9       Isolation/Infection:   Isolation            No Isolation          Patient Infection Status    None to display         Nurse Assessment:  Last Vital Signs: /78   Pulse 70   Temp 98.2 °F (36.8 °C) (Oral)   Resp 17   Ht 1.727 m (5' 8\")   Wt 98.9 kg (218 lb)   LMP 2025   SpO2 100%   BMI 33.15 kg/m²     Last documented pain score (0-10 scale):    Last Weight:   Wt Readings from Last 1 Encounters:   25 98.9 kg (218 lb)     Mental Status:  {IP PT MENTAL STATUS:}    IV Access:  { JOSE C IV ACCESS:308995813}    Nursing Mobility/ADLs:  Walking   {CHP DME ADLs:164074786}  Transfer  {CHP DME ADLs:749254228}  Bathing  {CHP DME ADLs:741501814}  Dressing  {CHP DME ADLs:645137830}  Toileting  {CHP DME ADLs:851647359}  Feeding  {CHP DME

## 2025-03-31 NOTE — ED NOTES
Patient mobility status  with no difficulty.     I have reviewed discharge instructions with the patient.  The patient verbalized understanding.    Patient left ED via Discharge Method: ambulatory to Home with  family/ friend .    Opportunity for questions and clarification provided.     Patient given 0 scripts.

## 2025-03-31 NOTE — ED TRIAGE NOTES
PT ambulatory to triage with c/o hypertension.     PT called PCP line and they referred her here to be evaluated.     PT reports increased lower leg swelling and intermittent SOB increasing upon ambulation.    PT also endorses intermittent headaches and dizziness in the last few weeks     Denies any chest pain at this time

## 2025-03-31 NOTE — ED PROVIDER NOTES
Emergency Department Provider Note       PCP: Nilesh hBatia APRN - NP   Age: 49 y.o.   Sex: female     DISPOSITION Decision To Discharge 03/31/2025 11:56:18 AM    ICD-10-CM    1. Dyspnea on exertion  R06.09           Medical Decision Making     Patient is a 49-year-old female with a past medical history of hypothyroidism presenting to the emergency department with a chief complaint of bilateral leg swelling and intermittent shortness of breath.  Physical exam is reassuring, there are no adventitious sounds heard with auscultation of the lungs.  There is no lower extremity edema or swelling, neurovascular exam is intact.  Sensation of dizziness is not reproducible on physical exam.   Patient denies active symptoms at this time.  Neurological exam is intact.  Differentials include PE, ACS, pancreatitis, hypothyroidism, electrolyte disorder.  Will obtain chest x-ray, troponin, PE, CBC, CMP and TSH.    Presentation is not consistent with ACS, nonischemic EKG and negative troponin, pericardial effusion/tamponade.  Does not correlate with CHF, no evidence of fluid overload on physical exam, negative BNP.  D-dimer negative, low suspicion for pulmonary embolism.  No evidence of pneumothorax on chest x-ray.  There are no adventitious sounds heard with auscultation of the lungs.  CMP, TSH and CBC is within normal limits.  At this time patient is suitable for outpatient follow-up given reassuring workup in emergency department, O2 sat 100%, stable vital signs, asymptomatic at this time.  Instructed patient to follow-up with primary care provider tomorrow for appointment.  Discussed warning symptoms with patient.  Patient was agreeable to treatment plan.  Strict return precautions provided, she verbalized understanding.      1 acute, uncomplicated illness or injury.  Shared medical decision making was utilized in creating the patients health plan today.  I independently ordered and reviewed each unique test.           I

## 2025-05-13 ENCOUNTER — OFFICE VISIT (OUTPATIENT)
Age: 50
End: 2025-05-13
Payer: COMMERCIAL

## 2025-05-13 VITALS
BODY MASS INDEX: 33.65 KG/M2 | HEART RATE: 76 BPM | WEIGHT: 222 LBS | DIASTOLIC BLOOD PRESSURE: 88 MMHG | HEIGHT: 68 IN | SYSTOLIC BLOOD PRESSURE: 136 MMHG

## 2025-05-13 DIAGNOSIS — R60.9 EDEMA, UNSPECIFIED TYPE: ICD-10-CM

## 2025-05-13 DIAGNOSIS — R00.2 PALPITATIONS: ICD-10-CM

## 2025-05-13 DIAGNOSIS — Z82.49 FH: PREMATURE CORONARY HEART DISEASE: Primary | ICD-10-CM

## 2025-05-13 PROCEDURE — 99204 OFFICE O/P NEW MOD 45 MIN: CPT | Performed by: INTERNAL MEDICINE

## 2025-05-13 PROCEDURE — 93000 ELECTROCARDIOGRAM COMPLETE: CPT | Performed by: INTERNAL MEDICINE

## 2025-05-13 ASSESSMENT — ENCOUNTER SYMPTOMS
SHORTNESS OF BREATH: 0
VOMITING: 0
BLOATING: 0
BACK PAIN: 0
HEMOPTYSIS: 0
COUGH: 0
NAUSEA: 0
ORTHOPNEA: 0
BLURRED VISION: 0
DOUBLE VISION: 0
ABDOMINAL PAIN: 0

## 2025-05-13 NOTE — PROGRESS NOTES
[facility] CARDIOLOGY  [address] [place] [address], [address]  [place] [address]  PHONE: [phone]    25    NAME:  Xochitl Martinez  : 1975  MRN: [id number]         SUBJECTIVE:   Xochitl Martinez is a 50 y.o. female seen for a visit regarding the following:     Chief Complaint   Patient presents with    Palpitations       HPI: [Initial evaluation 2025]    No prior cardiac history.  Noted extended Holter from [facility] from 2025 with sinus rhythm with an average heart rate of 77 [around 17 days; patient's symptoms correlated with sinus rhythm/sinus tachycardia/PVCs per records]; occasional ectopy with 1% PVC burden.  Strong family history of premature coronary disease in her dad.    Referred for stated palpitations; discussed above extended Holter.  Sporadic episodes with symptoms can make her feel dyspneic.  Currently improved and she feels back to her baseline.  Can walk over a mile with no issues.  Had an ER visit from 3/31/2025 after being seen at her OB NP with some elevated BPs and referred to the ED.  Patient states no sx at the time.  Blood pressure noted at 140/80.  Negative ED workup.  Some dependent edema and worse at the end of the day.  Denies any chest discomfort.  Has been on thyroid supplementation long-term for over 10 years.  No recent changes.    Past Medical History, Past Surgical History, Family history, Social History, and Medications were all reviewed with the patient today and updated as necessary.     Allergies   Allergen Reactions    Gluten Meal Diarrhea     Food     Benzonatate Other (See Comments)    Egg White (Egg Protein) Other (See Comments)    Hydrocodone Headaches    Milk (Cow) Other (See Comments)    Nsaids Other (See Comments)     Was told not to take per Hematologist.    Adhesive Tape Rash     Dermabond, coband    Chlorhexidine Rash    Codeine Anxiety     Patient Active Problem List   Diagnosis    Ovarian cyst    Carcinoma of cervix (HCC)    Acquired

## 2025-05-19 ENCOUNTER — HOSPITAL ENCOUNTER (OUTPATIENT)
Dept: CT IMAGING | Age: 50
Discharge: HOME | End: 2025-05-22
Attending: INTERNAL MEDICINE

## 2025-05-19 DIAGNOSIS — Z82.49 FH: PREMATURE CORONARY HEART DISEASE: ICD-10-CM

## 2025-05-19 PROCEDURE — 75571 CT HRT W/O DYE W/CA TEST: CPT

## 2025-07-14 ENCOUNTER — OFFICE VISIT (OUTPATIENT)
Age: 50
End: 2025-07-14
Payer: COMMERCIAL

## 2025-07-14 VITALS
WEIGHT: 222 LBS | HEIGHT: 68 IN | BODY MASS INDEX: 33.65 KG/M2 | SYSTOLIC BLOOD PRESSURE: 150 MMHG | DIASTOLIC BLOOD PRESSURE: 100 MMHG | HEART RATE: 78 BPM

## 2025-07-14 DIAGNOSIS — Z82.49 FH: PREMATURE CORONARY HEART DISEASE: Primary | ICD-10-CM

## 2025-07-14 DIAGNOSIS — R93.1 AGATSTON CORONARY ARTERY CALCIUM SCORE LESS THAN 100: ICD-10-CM

## 2025-07-14 DIAGNOSIS — E78.2 HYPERLIPIDEMIA, MIXED: ICD-10-CM

## 2025-07-14 PROCEDURE — 99214 OFFICE O/P EST MOD 30 MIN: CPT | Performed by: INTERNAL MEDICINE

## 2025-07-14 RX ORDER — ROSUVASTATIN CALCIUM 5 MG/1
5 TABLET, COATED ORAL
Qty: 90 TABLET | Refills: 3 | Status: SHIPPED | OUTPATIENT
Start: 2025-07-14

## 2025-07-14 ASSESSMENT — ENCOUNTER SYMPTOMS
ORTHOPNEA: 0
VOMITING: 0
NAUSEA: 0
DOUBLE VISION: 0
BLOATING: 0
BLURRED VISION: 0
COUGH: 0
HEMOPTYSIS: 0
BACK PAIN: 0
ABDOMINAL PAIN: 0
SHORTNESS OF BREATH: 0

## 2025-07-14 NOTE — PROGRESS NOTES
[facility] CARDIOLOGY  [address] [place] [address], [address]  [place] [address]  PHONE: [phone]    25    NAME:  Xochitl Martinez  : 1975  MRN: [id number]         SUBJECTIVE:   Xochitl Martinez is a 50 y.o. female seen for a visit regarding the following:     Chief Complaint   Patient presents with    FH: premature coronary heart disease    Results     Had echo       HPI: [Initial evaluation 2025]    No prior cardiac history.  Noted extended Holter from [facility] from 2025 with sinus rhythm with an average heart rate of 77 [around 17 days; patient's symptoms correlated with sinus rhythm/sinus tachycardia/PVCs per records]; occasional ectopy with 1% PVC burden.  Strong family history of premature coronary disease in her dad.  Echo from 2025 with preserved EF and normal diastolic function.  Coronary calcium score of 17 from 2025.    Some intermittent palpitations which are mostly positional.  Denies any chest discomfort.  Discussed noted calcium score.  Elevated BPs today but states usually better at home.  Does not closely monitor.    Prior from initial evaluation from 2025-referred for stated palpitations; discussed above extended Holter.  Sporadic episodes with symptoms can make her feel dyspneic.  Currently improved and she feels back to her baseline.  Can walk over a mile with no issues.  Had an ER visit from 3/31/2025 after being seen at her OB NP with some elevated BPs and referred to the ED.  Patient states no sx at the time.  Blood pressure noted at 140/80.  Negative ED workup.  Some dependent edema and worse at the end of the day.  Denies any chest discomfort.  Has been on thyroid supplementation long-term for over 10 years.  No recent changes.    Abnormal calcium score-not controlled, hypertension-not controlled, hyperlipidemia -not controlled    Past Medical History, Past Surgical History, Family history, Social History, and Medications were all reviewed with the

## 2025-07-16 ENCOUNTER — TELEPHONE (OUTPATIENT)
Age: 50
End: 2025-07-16

## 2025-07-16 DIAGNOSIS — E03.9 ACQUIRED HYPOTHYROIDISM: Primary | ICD-10-CM

## 2025-07-16 NOTE — TELEPHONE ENCOUNTER
Pt chart message: Since Monday I have purchased the ADMET[employer]ia monitor and been recording my BP.  Would you send in a script for a low dose BP med please     Even when I feel completely calm and resting I am still staying between 145/82 and up to 150/90 Message sent to Dr Schumacher for him to advise

## 2025-07-17 RX ORDER — LOSARTAN POTASSIUM 50 MG/1
50 TABLET ORAL DAILY
Qty: 90 TABLET | Refills: 3 | Status: SHIPPED | OUTPATIENT
Start: 2025-07-17

## 2025-07-17 NOTE — TELEPHONE ENCOUNTER
Khang Schumacher MD  [facility] Cardiology [id number] hours ago (5:27 PM)       Lets start her on losartan 50 mg daily and see how she does.  Target blood pressures less than 130/80.  Check a BMP in a couple weeks to make sure her renal function is stable on the medication.

## 2025-07-17 NOTE — TELEPHONE ENCOUNTER
I called and informed pt.of MD response and she v/u.Med escribed as below  Requested Prescriptions     Signed Prescriptions Disp Refills    losartan (COZAAR) 50 MG tablet 90 tablet 3     Sig: Take 1 tablet by mouth daily     Authorizing Provider: ANAND BARNETT     Ordering User: LIZET REESE     Lab ordered as below:  Orders Placed This Encounter   Procedures    Basic Metabolic Panel     Standing Status:   Future     Expected Date:   7/31/2025     Expiration Date:   7/17/2026